# Patient Record
Sex: MALE | Race: WHITE | Employment: FULL TIME | URBAN - METROPOLITAN AREA
[De-identification: names, ages, dates, MRNs, and addresses within clinical notes are randomized per-mention and may not be internally consistent; named-entity substitution may affect disease eponyms.]

---

## 2020-04-25 ENCOUNTER — HOSPITAL ENCOUNTER (EMERGENCY)
Facility: HOSPITAL | Age: 25
Discharge: HOME | End: 2020-04-25
Attending: EMERGENCY MEDICINE
Payer: COMMERCIAL

## 2020-04-25 ENCOUNTER — APPOINTMENT (OUTPATIENT)
Dept: CT IMAGING | Facility: HOSPITAL | Age: 25
End: 2020-04-25
Attending: EMERGENCY MEDICINE
Payer: COMMERCIAL

## 2020-04-25 VITALS
WEIGHT: 135 LBS | BODY MASS INDEX: 23.05 KG/M2 | OXYGEN SATURATION: 97 % | DIASTOLIC BLOOD PRESSURE: 86 MMHG | TEMPERATURE: 99 F | RESPIRATION RATE: 16 BRPM | SYSTOLIC BLOOD PRESSURE: 122 MMHG | HEIGHT: 64 IN | HEART RATE: 76 BPM

## 2020-04-25 DIAGNOSIS — R10.9 ABDOMINAL PAIN, UNSPECIFIED ABDOMINAL LOCATION: Primary | ICD-10-CM

## 2020-04-25 PROCEDURE — 74177 CT ABD & PELVIS W/CONTRAST: CPT | Performed by: EMERGENCY MEDICINE

## 2020-04-25 PROCEDURE — 96374 THER/PROPH/DIAG INJ IV PUSH: CPT

## 2020-04-25 PROCEDURE — 96361 HYDRATE IV INFUSION ADD-ON: CPT

## 2020-04-25 PROCEDURE — 80053 COMPREHEN METABOLIC PANEL: CPT | Performed by: EMERGENCY MEDICINE

## 2020-04-25 PROCEDURE — 85025 COMPLETE CBC W/AUTO DIFF WBC: CPT | Performed by: EMERGENCY MEDICINE

## 2020-04-25 PROCEDURE — 99284 EMERGENCY DEPT VISIT MOD MDM: CPT

## 2020-04-25 PROCEDURE — 96375 TX/PRO/DX INJ NEW DRUG ADDON: CPT

## 2020-04-25 PROCEDURE — 83690 ASSAY OF LIPASE: CPT | Performed by: EMERGENCY MEDICINE

## 2020-04-25 PROCEDURE — 99285 EMERGENCY DEPT VISIT HI MDM: CPT

## 2020-04-25 RX ORDER — DIPHENHYDRAMINE HYDROCHLORIDE 50 MG/ML
25 INJECTION INTRAMUSCULAR; INTRAVENOUS ONCE
Status: COMPLETED | OUTPATIENT
Start: 2020-04-25 | End: 2020-04-25

## 2020-04-25 RX ORDER — PROPRANOLOL HYDROCHLORIDE 20 MG/1
20 TABLET ORAL 3 TIMES DAILY
COMMUNITY

## 2020-04-25 RX ORDER — ONDANSETRON 2 MG/ML
4 INJECTION INTRAMUSCULAR; INTRAVENOUS ONCE
Status: COMPLETED | OUTPATIENT
Start: 2020-04-25 | End: 2020-04-25

## 2020-04-25 RX ORDER — BENZTROPINE MESYLATE 2 MG/1
2 TABLET ORAL ONCE
Status: COMPLETED | OUTPATIENT
Start: 2020-04-25 | End: 2020-04-25

## 2020-04-25 RX ORDER — DEXTROAMPHETAMINE SACCHARATE, AMPHETAMINE ASPARTATE, DEXTROAMPHETAMINE SULFATE AND AMPHETAMINE SULFATE 2.5; 2.5; 2.5; 2.5 MG/1; MG/1; MG/1; MG/1
30 TABLET ORAL ONCE
Status: COMPLETED | OUTPATIENT
Start: 2020-04-25 | End: 2020-04-25

## 2020-04-25 RX ORDER — PROPRANOLOL HYDROCHLORIDE 20 MG/1
20 TABLET ORAL ONCE
Status: COMPLETED | OUTPATIENT
Start: 2020-04-25 | End: 2020-04-25

## 2020-04-25 RX ORDER — DEXTROAMPHETAMINE SACCHARATE, AMPHETAMINE ASPARTATE, DEXTROAMPHETAMINE SULFATE AND AMPHETAMINE SULFATE 2.5; 2.5; 2.5; 2.5 MG/1; MG/1; MG/1; MG/1
10 TABLET ORAL EVERY EVENING
COMMUNITY

## 2020-04-25 RX ORDER — BENZTROPINE MESYLATE 2 MG/1
2 TABLET ORAL 2 TIMES DAILY
COMMUNITY

## 2020-04-25 RX ORDER — METOCLOPRAMIDE HYDROCHLORIDE 5 MG/ML
10 INJECTION INTRAMUSCULAR; INTRAVENOUS ONCE
Status: COMPLETED | OUTPATIENT
Start: 2020-04-25 | End: 2020-04-25

## 2020-04-25 NOTE — ED NOTES
Patient is resting comfortably. Updated patient's parents on plan of care, left voicemail for Negar and Illene Situ with [facility] that patient is being discharged from the ED and will need transportation back.  Patient's am medications were

## 2020-04-25 NOTE — CM/SW NOTE
Spoke with father. He has confirmed that Negar, supervisor of his son's group home, will pick him up.     Group home at [facility], [address] [place] [address]  [phone]

## 2020-04-25 NOTE — ED NOTES
PT BACK FROM CT. PT ESCORTED BY PCT TO MAINTAIN PT SAFETY. SIDE RAILS UP, CALL LIGHT WITH IN REACH, VS DONE.

## 2020-04-25 NOTE — ED PROVIDER NOTES
Patient Seen in: [facility] Emergency Department      History   Patient presents with:  Abdominal Pain  Anxiety/Panic attack    Stated Complaint: Anxiety;  Abdl Pain    HPI    Patient is a 23-year-old male history of autism, poor historian comes in w Pulmonary:      Effort: Pulmonary effort is normal. No respiratory distress. Abdominal:      General: There is no distension. Palpations: Abdomen is soft. Tenderness: There is tenderness in the epigastric area.    Musculoskeletal: Normal range reduction techniques     were used. Dose information is     transmitted to the  [facility]) NRDR ([address]) which includes the Dose Index Registry.          PATIENT STATED HISTORY:(As transcribed by Iraida Mtz [id number] [phone]    In 2 days  Return to the ER if you feel worse in any way, Return to the ER if you have any concerns          Medications Prescribed:  Current Discharge Medication List

## 2020-04-25 NOTE — ED NOTES
Negar from group home states they will not  the patient.  discussing with family and group home to arrange transport back to group [place].

## 2020-04-25 NOTE — ED NOTES
Patient walked out to [facility] in front of the ER. Staff given patient binder and discharge papers. Staff informed which medications patient had been received.

## 2020-04-25 NOTE — ED INITIAL ASSESSMENT (HPI)
Pt to ED brought by EMS for c/o Anxiety and Abdl Pain. Per EMS report, Pt lives in a group home - Pt was c/o inability to sleep last night and was requesting group home staff to give his AM meds.  Per report, group home staff declined to give Pt's 8 AM meds

## 2020-04-26 ENCOUNTER — HOSPITAL ENCOUNTER (EMERGENCY)
Facility: HOSPITAL | Age: 25
Discharge: STILL ADMITTED OR ADMINISTRATIVE | End: 2020-04-26
Payer: COMMERCIAL

## 2022-05-17 ENCOUNTER — OCC HEALTH (OUTPATIENT)
Dept: OCCUPATIONAL MEDICINE | Age: 27
End: 2022-05-17
Attending: PREVENTIVE MEDICINE

## 2022-05-17 DIAGNOSIS — Z77.21 EXPOSURE TO BLOOD-BORNE PATHOGEN: Primary | ICD-10-CM

## 2022-05-17 LAB
HBV SURFACE AG SER-ACNC: <0.1 [IU]/L
HBV SURFACE AG SERPL QL IA: NONREACTIVE
HCV AB SERPL QL IA: NONREACTIVE

## 2022-05-17 PROCEDURE — 86803 HEPATITIS C AB TEST: CPT

## 2022-05-17 PROCEDURE — 87340 HEPATITIS B SURFACE AG IA: CPT

## 2022-05-17 PROCEDURE — 87389 HIV-1 AG W/HIV-1&-2 AB AG IA: CPT

## 2022-06-09 NOTE — ED QUICK NOTES
Group Home sitter notified that we are still waiting for Psychiatrist to come down and evaluate patient. Patient was given a menu so he can order dinner.
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
## 2022-06-09 NOTE — ED QUICK NOTES
Patient is alert and oriented, resting on the cot, no distress noted. Will continue to monitor. Group home sitter at bedside.
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
## 2022-06-09 NOTE — ED QUICK NOTES
Assumed care of patient at this time. Patient is alert and oriented, resting on the cot, sitter is at bedside. Will continue to monitor.

## 2022-06-09 NOTE — ED QUICK NOTES
Patient asking for his afternoon meds. Dr. Wendi Johnson notified and states medication order will be placed.

## 2022-06-10 NOTE — ED PROVIDER NOTES
No issues during my shift. Patient is autistic and having aggressive behavior. He is awaiting transfer to inpatient psychiatric facility.
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
## 2022-06-10 NOTE — ED QUICK NOTES
Patient remains calm and cooperative. Patient ordered dinner tray and took a shower. Sitter remains at bedside.

## 2022-06-10 NOTE — ED NOTES
[facility]: Deflected d/t acuity  [facility]: No beds  [facility]: No beds  [facility]: Deflected d/t acuity  [facility]: Deflected d/t acuity  [facility]: Deflected d/t acuity  [facility]: Deflected d/t \"Cannot take adult Pt who is not their own guardian\"  [facility]: Deflected d/t OON  [facility]: Deflected d/t acuity  [facility]: Deflected d/t acuity  [facility]: Will review, packet faxed  [facility]: Deflected d/t [address] [facility] [address]: [address]: Packet faxed, deflected d/t unit full

## 2022-06-10 NOTE — ED NOTES
[facility]- [facility]

## 2022-06-10 NOTE — ED PROVIDER NOTES
No issues during shift. Patient was evaluated by Dr. Barry Clark.   Awaiting transfer to inpatient psychiatric facility

## 2022-06-10 NOTE — ED NOTES
Transfer progress during noc shift  [facility]- no acute beds  [facility]- clinical faxed  [facility]- no beds  [facility]- no beds  [facility]- no beds

## 2022-06-11 NOTE — ED NOTES
Spoke with Fanny Altman at [address] [place] [address] who stated that Referral Packet has yet to be reviewed. Fanny Altman stated that when RN to RN takes place, the Nurse can communicate to [facility] Nurse if any medication list from [address] [place] [address] is needed.

## 2022-06-11 NOTE — ED QUICK NOTES
Spoke with Yash Schultz at [facility] have accepted the patient but cannot have him transfer to their facilities until tomorrow morning 1000    Direct line for [facility] is: [phone]

## 2022-06-11 NOTE — ED NOTES
Received a call back from [facility] who stated that they are unable to accommodate due to acuity.
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
## 2022-06-11 NOTE — ED NOTES
Patient remains cooperative at this time. Patient with no other complaints at this time. Patient has been able to eat and drink without difficulty here in the ER. Patient's case endorsed to my colleague, Dr. Gely Blair at the end of my shift.

## 2022-06-11 NOTE — ED QUICK NOTES
Patient requested lunch be ordered. Patient requested pizza, cookie, and a coke. Food tray called down to food services.

## 2022-06-11 NOTE — ED NOTES
Received a call back from [facility] in ED Registration who confirmed that Patient does have [employer] as a primary insurance. Medicaid is listed as secondary. Facesheet has been updated.

## 2022-06-11 NOTE — PROGRESS NOTES
Assumed pt care at [address] [place] [address] Rd. Pt slept all night. Remains calm and cooperative this morning. Breakfast tray to be ordered. Staff will continue to monitor.

## 2022-06-11 NOTE — ED NOTES
Called the following Hospitals for in-patient placement:    [address]- No beds available    [facility]- No beds available     [facility]- No beds available    [facility]- Unable to accommodate due to 1 bed available and they have a patient in their ED    [facility]- No acute beds available    [facility]- No beds available    [facility]- No beds available. It was suggested to call back later this afternoon to inquire about any discharges    [facility]- No beds available    [facility]- Referral made over the phone. Referral Packet has been faxed for review. Patient will need an EKG    [facility]- Left voice mail message    [facility]- No beds available    [facility]- Referral made over the phone.  Referral Packet has been faxed for review    [facility]- No beds available    [facility]- Unable to accommodate patients who have autism    [facility]- Unable to accommodate patients who have autism    [facility]- Unable to accommodate patients who have autism    [facility]- Unable to reach Intake

## 2022-06-11 NOTE — ED PROVIDER NOTES
The patient has been cooperative and calm while here in the emergency department. I was told by the nurses had spoken to the caseworkers that we had exhausted every possible place to transfer the patient. He is a hard transfer secondary to his history of violent behavior and his autism. I was told that if the patient is still here on Monday, June 13 and has not had any violent outbursts they would likely attempt to discharge the patient to his family. In the interim he will need to continue to wait here for possible placement in a facility since the group home will not take him back until he has had some admission.

## 2022-06-11 NOTE — ED NOTES
Called Ramirez's Mom/Legal Guardian, Esteban Walters to provide an update on hospital placement. Informed Shelby that [facility] and [facility] are reviewing for placement. [facility] stated that their Unit would need the Physician's Orders for medications from the [address] [place] [address]. Esteban Walters stated that she did not have the orders and that we would need to reach out to the group home.

## 2022-06-11 NOTE — ED NOTES
Called the following Hospitals for in-patient:    [facility]- No beds available     [facility]- No beds available    [facility]- No beds available    [facility] to reach [facility] with Edna in Intake. When running insurance, she had stated that it was coming up as Patient having [facility] was unable to continue with referral as [employer] is [employer] Ty Mo, ED Registration to verify insurance. Joanna Julian was with a patient and will call back.

## 2022-06-11 NOTE — ED NOTES
This writer met w/Ramirez who was calm, cooperative and excited when this writer brought in cards and play-anne for him to play with to help keep his mind occupied. This writer will check back in.

## 2022-06-11 NOTE — ED NOTES
[facility] states they have been busy and haven't been able to review the transfer packet yet. They state that it will probably be reviewed on Noc shift.

## 2022-06-12 NOTE — ED NOTES
Received a call from the pt's parents (around 7:40pm) who had several questions:    Q: Is [facility] a good hospital? A: patients that go to [address] [place] [address] sometimes have good experiences and sometimes don't, just like at any other [facility].     Q: Should we bring belongings to [facility] or [address] [place] [address]? A: Belongings can go to either place, it's only a matter of preference. Q: Can the pt call us? A: I will ask the nurse to provide the pt with a phone.

## 2022-06-12 NOTE — ED QUICK NOTES
Recd report from [facility]. Patient belongings given to family. Transport to facility ETA 1600, patient and family updated on POC, wctm closely.

## 2022-06-12 NOTE — ED NOTES
This writer checked in on Shola Maritza first thing this am.  Shola Salas was awake and told this writer he had pancakes for breakfast and was wanting to know what time he would be leaving. This writer informed it that it would be after lunch, but we would update him and his family when we got a more definitive time. Shola Salas said he still has his crayons, play-anne and paper I had given him yesterday and did not need anything else.

## 2022-06-12 NOTE — ED NOTES
Spoke with [facility] who confirmed that transportation is to be set up for 4:00PM.    Spoke with Ramirez's parents/ Legal Guardian,  Ana Luisa Kaur and Andi (who were in the room),regarding placement. Informed Ana Luisa Kaur and Elizabeth Alfonso that transfer will take place at 4:00PM. Ana Luisa Kaur inquired about visiting hours. Called [facility] and was provided the following information:    -Elizabeth Alfonso will be admitted to [facility]  -There is no in-person visiting. However, Phone time is 7 days a week. 10-10:30AM, 2-2:30PM and 6-7PM  -Direct Phone Number to Unit is [phone], Patient GUS Chavez stated that they would be contacting Ana Luisa Kaur as well before transfer. Provided all information from [facility] Dragan Keita regarding scheduled phone time, and direct contact information to the Unit to [facility]. Ana Luisa Kaur stated that Kristen Keita had also contacted her by phone.

## 2022-06-12 NOTE — ED QUICK NOTES
Spoke with Hernan Dillon - mother and legal guardian of patient - who is requesting to see if it's possible for the patient not to go to [facility] and to go home with parents instead. This RN will forward mother's request to [facility] shift [place]  and ED RN for follow up later this evening.
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
## 2022-07-14 NOTE — ED QUICK NOTES
Assume pt care, report from [facility]. Pt sound asleep in bed. Resps easy, regular.  1:1 sitter remains at bedside
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
## 2022-07-14 NOTE — PROGRESS NOTES
07/13/22 2012   COVID Exposure Risk Screening   Do you have any of the following new or worsening symptoms of COVID-19? None   Have you been diagnosed with COVID-19 within the past 10 days? No   Are you awaiting COVID-19 test results or do you have a COVID-19 test scheduled? No   In the past 10 days, have you been in contact with someone who was confirmed or suspected to have COVID-19?  No

## 2022-07-14 NOTE — PROGRESS NOTES
07/13/22 214   Advance Directives for Healthcare   Does the patient have:  Guardian  (parents: Jaya Almanzar [facility])   Is there a copy on the chart?  Yes

## 2022-07-14 NOTE — ED NOTES
Pt was assessed and recommended inpatient admission. Pt and parents/Legal Guardians were updated on the POC. Pt presented as ox4 and was agreeable with the POC. Pt and father signed paperwork and received copies. Parents/guardians request [facility] or [facility].

## 2022-07-14 NOTE — ED NOTES
[id number] [facility] NO appropriate beds. Was told to call again tomorrow    [facility] multiple times during the day.   NO answer

## 2022-07-14 NOTE — ED QUICK NOTES
Patient declines breakfast at this time, will order when more awake. Denies any other needs at this time.

## 2022-07-14 NOTE — ED QUICK NOTES
Parents left for the night, pt changed into gown and belongings placed in smart safe bag.  Parents took patients medication home with them
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
## 2022-07-14 NOTE — ED PROVIDER NOTES
Patient calm and cooperative during my shift. No issues. Patient having auditory hallucinations. Awaiting placement.

## 2022-07-14 NOTE — ED NOTES
[facility]- no beds tonight at [facility]. They state they will keep the transfer packet and to call back tomorrow to see if there are discharges.

## 2022-07-14 NOTE — ED NOTES
[facility]- no beds tonight.  They state to call back tomorrow to see if they have discharges  [facility]- clinical faxed for review for [facility]

## 2022-07-15 NOTE — BH PROGRESS NOTE
[facility]'s intake called this writer and said that they now have an opening at their [facility] location. Accepting dr is Dr. Lourdes Perez. Address is [address] in [place]. Once the ambulance arrives at [facility] ER, pt's RN is supposed to call for N2N at [phone]. Writer notified pt's mom of this info, mom said she wants to discuss with pt's dad, who is in the ER with the pt. Writer told her to have the dad give their final decision to pt's RN Love Justin. Writer notified Love Justin of this information, as well as [facility] Cindi Doan.

## 2022-07-15 NOTE — ED QUICK NOTES
Spoke to [place] from [facility] for nurse to nurse report, to be contacted soon for admission status.

## 2022-07-15 NOTE — ED NOTES
Placement:     [facility]- bed request made. No appropriate bed    [facility]- No appropriate bed    [facility]- No beds available (no clinical provided)    [facility]- No appropriate bed    [facility]- faxed updated paperwork for packet. No beds tonight, but intake team will forward request for noc shift to review for possibility of bed tomorrow    [facility]- Per Gwynn[facility] Patches, no beds    [facility]- No appropriate bed    [facility]- No appropriate beds. Was told to call again tomorrow  [address] multiple times during the day.  No answer    [facility]- Still no discharges and as of right now, no planned DC's for tomorrow

## 2022-07-15 NOTE — BH PROGRESS NOTE
[place] [address] has an accepting dr, Dr. Dereck Shannon, but according to [facility] staff in the ED, the pt's mom/guardian does not want the pt to go to a hospital that far. Mom is requesting [facility]. Writer notified [place].

## 2022-07-15 NOTE — ED NOTES
Updated pt's mother regarding placement search/status.  No updates as of this time but will continue to follow up

## 2022-07-15 NOTE — BH PROGRESS NOTE
Placement 7/15/22:    [facility]-reviewing packet    [facility] his packet    [facility]-no bed on the unit he'd need, if there is a discharge, they will call us, they have his packet    [facility]-No discharges today, was told to call again tomorrow.     [facility]-    NO CLINICALLY APPROPRIATE BEDS:  [facility]  [address] [facility]  [address] [address]  [place]:  Jamir Metzger  [place]    NO BEDS (NO CLINICAL PROVIDED):  [address] [facility]  [address]  [facility]

## 2022-07-15 NOTE — ED QUICK NOTES
Informed by José Miguel Babin, that patient was accepted by [facility] in [facility]. Informed father of patient's acceptance. Per facility once ambulance arrives for patient transport, nurse to nurse can be called at [phone]. Accepting doctor is Dr. Brenton Marie.

## 2022-07-15 NOTE — ED QUICK NOTES
Family of patient was informed of patient's acceptance to [facility], pending a new covid test. Patient has also being accepted to [facility] in [facility]. Mother would like to talk to someone in [facility] before making a decision. Family is aware that it is time sensitive. Writer called [facility] intake to update; they said for us to call them tomorrow to see if a bed is available.

## 2022-07-15 NOTE — ED NOTES
Faxed updated documentation and vitals to [facility] for [facility] to review. [facility] needs to follow up with them in the AM regarding disposition of bed availability. Per Navarro Herring will be able to accommodate, but admission will depend on staffing. Pt's father updated of plan of care and will let mom know.

## 2022-07-15 NOTE — ED QUICK NOTES
Both Father and Mother spoke to [place] [address] in [place]. Family would like to wait for a closer location to have availability. At this time, they would like to hold off [facility].

## 2022-07-15 NOTE — BH PROGRESS NOTE
[facility] will be able to accommodate pt today; writer asked pt's BREANNE Meehan to please do a repeat rapid COVID test.

## 2022-07-15 NOTE — ED QUICK NOTES
Took over care for patient from [facility]. Patient resting comfortably in room with mother at bedside.

## 2022-07-16 NOTE — ED QUICK NOTES
EMS at bedside for patient transport to [facility]. Nurse to nurse report given to [facility].

## 2022-07-31 NOTE — ED NOTES
Referral Packet has been faxed to [facility] for review. [facility] stated that they have a bed available at [facility] location. There are a few beds available at the [facility] location. Called Ramirez's Mom/Legal Guardian, Vimal Zambrano to inform her that referral has been made to [facility]. Vimal Zambrano stated that she prefers the [facility] location. Vimal Zambrano was pleased with the care from [address] [place] [address] recent admission. Vimal Zambrano wants to hold off making referrals to other hospitals at this time. Will wait for disposition from [facility].

## 2022-07-31 NOTE — ED QUICK NOTES
Spoke with pt's mother, Ken Mullins. [phone]    Mother states she spoke with transfer team at [facility] in [place], [place] stating they have a bed available and reviewing pt's chart at this time. Mother called for update on pt. Mother also states that the reason she and pt's father are not coming to the hospital to visit is to avoid the impression that pt can go back to their house after. Mother states pt \"is just trying to escape the group home. \"
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
## 2022-07-31 NOTE — ED NOTES
Writer introduced self/role to pt who presents as calm and cooperative. Staff member from group home was present in the room. Writer offered unit resources to pt who refused at this time. Staff member stated that they would notify writer if the pt needed anything.
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
## 2022-08-01 ENCOUNTER — APPOINTMENT (OUTPATIENT)
Dept: GENERAL RADIOLOGY | Facility: HOSPITAL | Age: 27
End: 2022-08-01
Attending: EMERGENCY MEDICINE
Payer: COMMERCIAL

## 2022-08-01 ENCOUNTER — APPOINTMENT (OUTPATIENT)
Dept: CT IMAGING | Facility: HOSPITAL | Age: 27
End: 2022-08-01
Attending: EMERGENCY MEDICINE
Payer: COMMERCIAL

## 2022-08-01 PROCEDURE — 74177 CT ABD & PELVIS W/CONTRAST: CPT | Performed by: EMERGENCY MEDICINE

## 2022-08-01 PROCEDURE — 71275 CT ANGIOGRAPHY CHEST: CPT | Performed by: EMERGENCY MEDICINE

## 2022-08-01 PROCEDURE — 71045 X-RAY EXAM CHEST 1 VIEW: CPT | Performed by: EMERGENCY MEDICINE

## 2022-08-01 NOTE — ED PROVIDER NOTES
Patient will be transferred to psychiatric hospital in [facility] issues over the course of my shift

## 2022-08-01 NOTE — ED NOTES
Called [facility] for update on referral status and was asked about the chest x-ray and CT that were ordered. Writer agreed to fax over MD note. [facility] RN also asked why the pt's WBC might be elevated, and writer agreed to agreed to find out.

## 2022-08-01 NOTE — ED NOTES
This writer received an incoming phone call from [facility] completing nurse to nurse for patient. She wants to speak with nurse for more information.

## 2022-08-01 NOTE — ED NOTES
This writer contacted [facility] admissions number at [phone]. This writer was informed to call Mobile number (mailbox is full) at [phone]. This writer left a voicemail message.

## 2022-08-01 NOTE — ED NOTES
This writer [facility]ed redrawn CBC [facility] nursing request.

## 2022-08-01 NOTE — ED QUICK NOTES
RN spoke with Alexandra Dueñas in [facility].  Updated lab results will be sent to [facility]

## 2022-08-01 NOTE — ED PROVIDER NOTES
I went to assess the patient and he tells me that his tummy hurts. He does have a white blood cell count of 20.9 and on exam now he is does seem to be a little distended. He indicates that his pain is most in the right upper quadrant but he has a really odd affect he is smiling in agreement with me with a big smile on his face I think unfortunately that that makes communication hard with his underlying significant autism.   But he has a white count of 20.9 and he tells me that his abdomen hurts I am going to add on a lipase and a CT abdomen pelvis differential diagnosis includes acute appendicitis, cholecystitis, appendicitis, bowel obstruction he has had an episode of vomiting here in the emergency department I do not see where he has had abdominal surgery before

## 2022-08-01 NOTE — ED PROVIDER NOTES
Lipase normal.    CT scan of the abdomen pelvis shows no acute abnormality although there are small bilateral pleural effusions. Patient denies chest pain, shortness of breath or cough    D-dimer and chest x-ray ordered    Chest x-ray clear    D-dimer slightly elevated    CT chest with small bilateral pleural effusions, no PE, nothing acute    Patient is medically clear at 445.     Still awaiting psychiatric placement

## 2022-08-01 NOTE — ED NOTES
[facility] called requesting a letter of return from the group home. A voicemail was left for [facility].

## 2022-08-01 NOTE — ED QUICK NOTES
Received report from Sabine Zuniga, [facility]. Pt resting on cart, calm and cooperative at this time. Patient waiting for tranpsort at this time.

## 2022-08-01 NOTE — ED NOTES
This writer contacted Primitivo Eugene of [facility] regarding a status of patients referral status. Patients white count was received and it is back to normal.      This writer discussed with Primitivo Eugene that they are looking at a bed for their [place], [place] location, for which they do have beds. They are requesting an updated temperature. This writer notified nursing.
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
## 2022-08-18 NOTE — ED NOTES
Discussed case with Dr. Dee Lance. Follow-up with outpatient is disposition. Spoke with group home staff at bedside who are not agreeable and refusing to have patient return. Dr. Dee Lance reiterated that patient is not meeting criteria for inpatient admission. Dr. Dee Lance stated that he will involve case management as this time to speak with group home.

## 2022-08-18 NOTE — CM/SW NOTE
EDCM received call from Dr. Zayra Camejo requesting we contact pt  and advise them to:    1. Hold patient depakote for a couple days due to elevated Depakote levels and recheck levels    2.  Pt anti ulcer medication and zofran sent to pharmacy for pickup    Contacted patient  Aixa Burden who informed me [facility] needs to fax an order to hold the depakote to nursing @ [phone]
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
## 2022-08-19 ENCOUNTER — HOSPITAL ENCOUNTER (EMERGENCY)
Facility: HOSPITAL | Age: 27
Discharge: HOME | End: 2022-08-19
Attending: EMERGENCY MEDICINE
Payer: COMMERCIAL

## 2022-08-19 VITALS
OXYGEN SATURATION: 95 % | HEIGHT: 68 IN | SYSTOLIC BLOOD PRESSURE: 128 MMHG | BODY MASS INDEX: 25.07 KG/M2 | DIASTOLIC BLOOD PRESSURE: 90 MMHG | RESPIRATION RATE: 18 BRPM | HEART RATE: 95 BPM | WEIGHT: 165.38 LBS | TEMPERATURE: 99 F

## 2022-08-19 DIAGNOSIS — L51.9 ERYTHEMA MULTIFORME: Primary | ICD-10-CM

## 2022-08-19 LAB
ALBUMIN SERPL-MCNC: 3.5 G/DL (ref 3.4–5)
ALBUMIN/GLOB SERPL: 0.9 {RATIO} (ref 1–2)
ALP LIVER SERPL-CCNC: 54 U/L
ALT SERPL-CCNC: 26 U/L
ANION GAP SERPL CALC-SCNC: 4 MMOL/L (ref 0–18)
AST SERPL-CCNC: 14 U/L (ref 15–37)
BASOPHILS # BLD AUTO: 0.06 X10(3) UL (ref 0–0.2)
BASOPHILS NFR BLD AUTO: 0.6 %
BILIRUB SERPL-MCNC: 0.3 MG/DL (ref 0.1–2)
BILIRUB UR QL STRIP.AUTO: NEGATIVE
BUN BLD-MCNC: 17 MG/DL (ref 7–18)
CALCIUM BLD-MCNC: 9.1 MG/DL (ref 8.5–10.1)
CHLORIDE SERPL-SCNC: 109 MMOL/L (ref 98–112)
CO2 SERPL-SCNC: 28 MMOL/L (ref 21–32)
COLOR UR AUTO: YELLOW
CREAT BLD-MCNC: 0.95 MG/DL
CRP SERPL-MCNC: 4.5 MG/DL (ref ?–0.3)
EOSINOPHIL # BLD AUTO: 0.96 X10(3) UL (ref 0–0.7)
EOSINOPHIL NFR BLD AUTO: 9 %
ERYTHROCYTE [DISTWIDTH] IN BLOOD BY AUTOMATED COUNT: 12.7 %
ERYTHROCYTE [SEDIMENTATION RATE] IN BLOOD: 13 MM/HR
GFR SERPLBLD BASED ON 1.73 SQ M-ARVRAT: 112 ML/MIN/1.73M2 (ref 60–?)
GLOBULIN PLAS-MCNC: 3.7 G/DL (ref 2.8–4.4)
GLUCOSE BLD-MCNC: 88 MG/DL (ref 70–99)
GLUCOSE UR STRIP.AUTO-MCNC: NEGATIVE MG/DL
HCT VFR BLD AUTO: 42.5 %
HGB BLD-MCNC: 14.2 G/DL
IMM GRANULOCYTES # BLD AUTO: 0.04 X10(3) UL (ref 0–1)
IMM GRANULOCYTES NFR BLD: 0.4 %
KETONES UR STRIP.AUTO-MCNC: NEGATIVE MG/DL
LEUKOCYTE ESTERASE UR QL STRIP.AUTO: NEGATIVE
LYMPHOCYTES # BLD AUTO: 2.12 X10(3) UL (ref 1–4)
LYMPHOCYTES NFR BLD AUTO: 19.8 %
MCH RBC QN AUTO: 31.5 PG (ref 26–34)
MCHC RBC AUTO-ENTMCNC: 33.4 G/DL (ref 31–37)
MCV RBC AUTO: 94.2 FL
MONOCYTES # BLD AUTO: 1.56 X10(3) UL (ref 0.1–1)
MONOCYTES NFR BLD AUTO: 14.6 %
NEUTROPHILS # BLD AUTO: 5.97 X10 (3) UL (ref 1.5–7.7)
NEUTROPHILS # BLD AUTO: 5.97 X10(3) UL (ref 1.5–7.7)
NEUTROPHILS NFR BLD AUTO: 55.6 %
NITRITE UR QL STRIP.AUTO: NEGATIVE
OSMOLALITY SERPL CALC.SUM OF ELEC: 293 MOSM/KG (ref 275–295)
PH UR STRIP.AUTO: 6 [PH] (ref 5–8)
PLATELET # BLD AUTO: 200 10(3)UL (ref 150–450)
POTASSIUM SERPL-SCNC: 4.3 MMOL/L (ref 3.5–5.1)
PROT SERPL-MCNC: 7.2 G/DL (ref 6.4–8.2)
PROT UR STRIP.AUTO-MCNC: NEGATIVE MG/DL
RBC # BLD AUTO: 4.51 X10(6)UL
RBC UR QL AUTO: NEGATIVE
RHEUMATOID FACT SERPL-ACNC: <10 IU/ML (ref ?–15)
SODIUM SERPL-SCNC: 141 MMOL/L (ref 136–145)
SP GR UR STRIP.AUTO: 1.03 (ref 1–1.03)
T PALLIDUM AB SER QL IA: NONREACTIVE
UROBILINOGEN UR STRIP.AUTO-MCNC: 4 MG/DL
VALPROATE SERPL-MCNC: 41.9 UG/ML (ref 50–100)
WBC # BLD AUTO: 10.7 X10(3) UL (ref 4–11)

## 2022-08-19 PROCEDURE — 86140 C-REACTIVE PROTEIN: CPT | Performed by: EMERGENCY MEDICINE

## 2022-08-19 PROCEDURE — 96374 THER/PROPH/DIAG INJ IV PUSH: CPT

## 2022-08-19 PROCEDURE — 86780 TREPONEMA PALLIDUM: CPT | Performed by: EMERGENCY MEDICINE

## 2022-08-19 PROCEDURE — 80164 ASSAY DIPROPYLACETIC ACD TOT: CPT | Performed by: EMERGENCY MEDICINE

## 2022-08-19 PROCEDURE — 80053 COMPREHEN METABOLIC PANEL: CPT | Performed by: EMERGENCY MEDICINE

## 2022-08-19 PROCEDURE — 99285 EMERGENCY DEPT VISIT HI MDM: CPT

## 2022-08-19 PROCEDURE — 85652 RBC SED RATE AUTOMATED: CPT | Performed by: EMERGENCY MEDICINE

## 2022-08-19 PROCEDURE — 85025 COMPLETE CBC W/AUTO DIFF WBC: CPT | Performed by: EMERGENCY MEDICINE

## 2022-08-19 PROCEDURE — 86038 ANTINUCLEAR ANTIBODIES: CPT | Performed by: EMERGENCY MEDICINE

## 2022-08-19 PROCEDURE — 81003 URINALYSIS AUTO W/O SCOPE: CPT | Performed by: EMERGENCY MEDICINE

## 2022-08-19 PROCEDURE — 99284 EMERGENCY DEPT VISIT MOD MDM: CPT

## 2022-08-19 PROCEDURE — 86431 RHEUMATOID FACTOR QUANT: CPT | Performed by: EMERGENCY MEDICINE

## 2022-08-19 PROCEDURE — 86200 CCP ANTIBODY: CPT | Performed by: EMERGENCY MEDICINE

## 2022-08-19 RX ORDER — DIPHENHYDRAMINE HCL 50 MG
50 CAPSULE ORAL ONCE
Status: COMPLETED | OUTPATIENT
Start: 2022-08-19 | End: 2022-08-19

## 2022-08-19 RX ORDER — PREDNISONE 20 MG/1
40 TABLET ORAL DAILY
Qty: 6 TABLET | Refills: 0 | Status: SHIPPED | OUTPATIENT
Start: 2022-08-19 | End: 2022-08-23

## 2022-08-19 RX ORDER — METHYLPREDNISOLONE SODIUM SUCCINATE 125 MG/2ML
60 INJECTION, POWDER, LYOPHILIZED, FOR SOLUTION INTRAMUSCULAR; INTRAVENOUS ONCE
Status: COMPLETED | OUTPATIENT
Start: 2022-08-19 | End: 2022-08-19

## 2022-08-19 RX ORDER — PREDNISONE 20 MG/1
60 TABLET ORAL ONCE
Status: DISCONTINUED | OUTPATIENT
Start: 2022-08-19 | End: 2022-08-19

## 2022-08-19 RX ORDER — DIPHENHYDRAMINE HYDROCHLORIDE 12.5 MG/5ML
25 SOLUTION ORAL 4 TIMES DAILY PRN
Qty: 1 EACH | Refills: 0 | Status: SHIPPED | OUTPATIENT
Start: 2022-08-19 | End: 2022-08-23

## 2022-08-19 NOTE — ED INITIAL ASSESSMENT (HPI)
Arrives with bumps to most of body x 3 days and vomiting since yesterday. Lives in a group home. Here with caretaker.

## 2022-08-19 NOTE — ED QUICK NOTES
Spoke to mom Everardo Silva on the phone she reports patient was on Amoxicillin on August 8th. Finished course, then started on Augmentin on August 15th. Patient also recently started Synthroid on August 9th. Mom unsure if these cause rash today. MD notified. Phone for mom .

## 2022-08-23 LAB — CCP IGG SERPL-ACNC: 1 U/ML (ref 0–6.9)

## 2022-08-23 NOTE — ED NOTES
Juan José in [facility] point requested for Pt's packet to be re-faxed. This writer faxed the Pt's complete packet to Juan José at [phone].
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
## 2022-08-23 NOTE — ED INITIAL ASSESSMENT (HPI)
Patient is very anxious calm and co-operative now . He told he was very anxious in the group home about his lunch.

## 2022-08-24 NOTE — ED NOTES
Writer called pt's mother Everardo Silva to inform her that the pt has been deflected by [facility] [place] [address]. Everardo Silva stated that she is okay with referring to [facility] Fitzgerald as well.

## 2022-08-24 NOTE — ED NOTES
At [employer] writer received call from [facility] deflecting the pt. Writer also called [facility] who said they would take the pt's packet again, so one was faxed.

## 2022-08-24 NOTE — ED NOTES
Mani Dinero from [facility] called stating they are unable to place pt at any of their facilities tonight due to high acuity on their units.

## 2022-08-24 NOTE — ED NOTES
Placement:    [facility]: Pt has exhausted therapeutic options    [facility]: Faxed packet. .. no beds     [facility]: Packet faxed

## 2022-08-24 NOTE — ED QUICK NOTES
Discharge Instructions for Cellulitis  You have been diagnosed with cellulitis. This is an infection in the deepest layer of the skin. In some cases, the infection also affects the muscle. Cellulitis is caused by bacteria. The bacteria canÂ enter the body through broken skin. This can happen with a cut, scratch, animal bite, or an insect bite that has been scratched. You may have been treated in the hospital with antibiotics and fluids. You will likely be given a prescription for antibiotics to take at home. This sheet will help you take care of yourself at home. Home Care  When you are home:  Â· Take the prescribed antibiotic medication you are given as directed until it is gone. Take it even if you feel better. It treats the infection and stops it from returning. Not taking all of the medication can make future infections hard to treat. Â· Keep the infected area clean. Â· When possible, raise the infected area above the level of your heart. This helps keep swelling down. Â· Talk to your doctor if you are in pain. Ask what kind of over-the-counter medication you can take for pain. Â· Apply clean bandages as advised. Â· Take your temperature once a day for a week. Â· Wash your hands often to prevent spreading the infection. In the future, wash your hands before and after you touch cuts, scratches, or bandages. This will help prevent infection. Â   When to Call Your Doctor  Call your doctor immediately if you have any of the following:  Â· Vomiting  Â· FeverÂ of100.4Â°F (38Â°C) or higher, or as directed by your health care provider  Â· Shaking chills  Â· Redness that gets worse in or around the infected area  Â· Swelling of the infected area  Â· Pain that gets worse in or around the infected area  Â· Difficulty or pain when moving the joints above or below the infected area  Â· Discharge or pus draining from the area   Â© 6288-4855 The [address], [facility] [address] All rights reserved.  This Pt resting comfortably. information is not intended as a substitute for professional medical care. Always follow your healthcare professional's instructions.

## 2022-08-24 NOTE — ED NOTES
Called pt's parents to inform them that [facility] had deflected the pt but [facility] said they would review again.

## 2022-08-24 NOTE — ED NOTES
Writer called [facility] for an update. Spoke with Thalia. She stated that pt has been deflected because he does not meet criteria.
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
## 2022-08-25 NOTE — ED QUICK NOTES
Spoke to [facility] from [address] [place] [address]. Facility is requesting med list from [facility] to review. Please call [facility] at [phone] with another update on pending med list in AM. Facility states if pt is accepted it may be around late afternoon or early evening.

## 2022-08-25 NOTE — ED QUICK NOTES
I spoke with Kimmy Bahena at [facility]. Accepting doctor Dr. Kenia Rosales.   Patient is able to go to admissions, to be picked up from this ER at 1700

## 2022-08-25 NOTE — ED NOTES
Spoke with Mary Olivares to again request med list to be faxed.  Facility to follow up with request as soon as possible

## 2022-08-25 NOTE — ED NOTES
Krunal is requesting the CBC, UDS, and a med list from the [facility]. The CBC and UDS were faxed to [address] [place] [address].  ER RN states she will call the [facility] to request the med list.
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
## 2022-08-25 NOTE — ED QUICK NOTES
Report received, sitter at bedside.   Patient awake, breakfast tray at bedside, sitter assisting the patient

## 2022-08-25 NOTE — ED NOTES
Pt's mother called for update regarding transfer - advised [facility] reviewing and stated that they will let us know if they are able to accept sometime later this afternoon. Will follow up with site to send requested updated med list and confirm if anything else is needed later this morning.

## 2022-08-25 NOTE — ED QUICK NOTES
Darshana left for group home in attempt to retrieve home med list. [facility] group home number [phone].

## 2022-08-25 NOTE — ED NOTES
Josephine Hernandez is awake and is unable to talk  Or process. Josephine Hernandez is asking repeatedly Chi Martinez is for dinner. \"  Staff has been reminding him he just finished with lunch and it is not time for dinner.

## 2022-08-25 NOTE — ED QUICK NOTES
All requested information was faxed to [address] [place] [address]  POS, CBC, Urine drug screen.

## 2022-08-25 NOTE — ED QUICK NOTES
Shalini Gibbs from [facility] was given nurse to nurse report, she will review some things, call the patient's home, and call us back
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
## 2022-08-25 NOTE — ED NOTES
This writer has not been able to meet with Jessica Sadler as he still appears to be sleeping with sitter in arms reach.

## 2023-04-18 ENCOUNTER — HOSPITAL ENCOUNTER (INPATIENT)
Facility: HOSPITAL | Age: 28
LOS: 14 days | Discharge: OTHER INSTITUTION | End: 2023-05-02
Attending: EMERGENCY MEDICINE | Admitting: HOSPITALIST
Payer: COMMERCIAL

## 2023-04-18 DIAGNOSIS — U07.1 COVID-19: Primary | ICD-10-CM

## 2023-04-18 DIAGNOSIS — R46.89 AGGRESSIVE BEHAVIOR: ICD-10-CM

## 2023-04-18 LAB
ALBUMIN SERPL-MCNC: 3.3 G/DL (ref 3.4–5)
ALBUMIN/GLOB SERPL: 1 {RATIO} (ref 1–2)
ALP LIVER SERPL-CCNC: 47 U/L
ALT SERPL-CCNC: 45 U/L
AMPHET UR QL SCN: NEGATIVE
ANION GAP SERPL CALC-SCNC: 2 MMOL/L (ref 0–18)
AST SERPL-CCNC: 23 U/L (ref 15–37)
BASOPHILS # BLD AUTO: 0.03 X10(3) UL (ref 0–0.2)
BASOPHILS NFR BLD AUTO: 0.4 %
BENZODIAZ UR QL SCN: NEGATIVE
BILIRUB SERPL-MCNC: 0.4 MG/DL (ref 0.1–2)
BILIRUB UR QL STRIP.AUTO: NEGATIVE
BUN BLD-MCNC: 13 MG/DL (ref 7–18)
CALCIUM BLD-MCNC: 8.6 MG/DL (ref 8.5–10.1)
CANNABINOIDS UR QL SCN: NEGATIVE
CHLORIDE SERPL-SCNC: 109 MMOL/L (ref 98–112)
CO2 SERPL-SCNC: 26 MMOL/L (ref 21–32)
COCAINE UR QL: NEGATIVE
COLOR UR AUTO: YELLOW
CREAT BLD-MCNC: 0.91 MG/DL
CREAT UR-SCNC: 293 MG/DL
EOSINOPHIL # BLD AUTO: 0.05 X10(3) UL (ref 0–0.7)
EOSINOPHIL NFR BLD AUTO: 0.7 %
ERYTHROCYTE [DISTWIDTH] IN BLOOD BY AUTOMATED COUNT: 13.2 %
ETHANOL SERPL-MCNC: <3 MG/DL (ref ?–3)
GFR SERPLBLD BASED ON 1.73 SQ M-ARVRAT: 117 ML/MIN/1.73M2 (ref 60–?)
GLOBULIN PLAS-MCNC: 3.3 G/DL (ref 2.8–4.4)
GLUCOSE BLD-MCNC: 113 MG/DL (ref 70–99)
GLUCOSE UR STRIP.AUTO-MCNC: NEGATIVE MG/DL
HCT VFR BLD AUTO: 43.7 %
HGB BLD-MCNC: 15.4 G/DL
IMM GRANULOCYTES # BLD AUTO: 0.02 X10(3) UL (ref 0–1)
IMM GRANULOCYTES NFR BLD: 0.3 %
KETONES UR STRIP.AUTO-MCNC: NEGATIVE MG/DL
LEUKOCYTE ESTERASE UR QL STRIP.AUTO: NEGATIVE
LYMPHOCYTES # BLD AUTO: 1.32 X10(3) UL (ref 1–4)
LYMPHOCYTES NFR BLD AUTO: 17.5 %
MCH RBC QN AUTO: 32 PG (ref 26–34)
MCHC RBC AUTO-ENTMCNC: 35.2 G/DL (ref 31–37)
MCV RBC AUTO: 90.7 FL
MONOCYTES # BLD AUTO: 0.67 X10(3) UL (ref 0.1–1)
MONOCYTES NFR BLD AUTO: 8.9 %
NEUTROPHILS # BLD AUTO: 5.45 X10 (3) UL (ref 1.5–7.7)
NEUTROPHILS # BLD AUTO: 5.45 X10(3) UL (ref 1.5–7.7)
NEUTROPHILS NFR BLD AUTO: 72.2 %
NITRITE UR QL STRIP.AUTO: NEGATIVE
OPIATES UR QL SCN: NEGATIVE
OSMOLALITY SERPL CALC.SUM OF ELEC: 285 MOSM/KG (ref 275–295)
OXYCODONE UR QL SCN: NEGATIVE
PH UR STRIP.AUTO: 6 [PH] (ref 5–8)
PLATELET # BLD AUTO: 215 10(3)UL (ref 150–450)
POTASSIUM SERPL-SCNC: 4.1 MMOL/L (ref 3.5–5.1)
PROT SERPL-MCNC: 6.6 G/DL (ref 6.4–8.2)
PROT UR STRIP.AUTO-MCNC: NEGATIVE MG/DL
RBC # BLD AUTO: 4.82 X10(6)UL
RBC UR QL AUTO: NEGATIVE
SARS-COV-2 RNA RESP QL NAA+PROBE: DETECTED
SODIUM SERPL-SCNC: 137 MMOL/L (ref 136–145)
SP GR UR STRIP.AUTO: 1.02 (ref 1–1.03)
UROBILINOGEN UR STRIP.AUTO-MCNC: <2 MG/DL
WBC # BLD AUTO: 7.5 X10(3) UL (ref 4–11)

## 2023-04-18 PROCEDURE — 99222 1ST HOSP IP/OBS MODERATE 55: CPT | Performed by: INTERNAL MEDICINE

## 2023-04-18 PROCEDURE — 90792 PSYCH DIAG EVAL W/MED SRVCS: CPT | Performed by: PHYSICIAN ASSISTANT

## 2023-04-18 RX ORDER — BISACODYL 10 MG
10 SUPPOSITORY, RECTAL RECTAL
Status: DISCONTINUED | OUTPATIENT
Start: 2023-04-18 | End: 2023-05-02

## 2023-04-18 RX ORDER — POLYETHYLENE GLYCOL 3350 17 G/17G
17 POWDER, FOR SOLUTION ORAL DAILY PRN
Status: DISCONTINUED | OUTPATIENT
Start: 2023-04-18 | End: 2023-04-23

## 2023-04-18 RX ORDER — TRAZODONE HYDROCHLORIDE 100 MG/1
100 TABLET ORAL NIGHTLY
Status: DISCONTINUED | OUTPATIENT
Start: 2023-04-18 | End: 2023-05-02

## 2023-04-18 RX ORDER — SODIUM PHOSPHATE, DIBASIC AND SODIUM PHOSPHATE, MONOBASIC 7; 19 G/133ML; G/133ML
1 ENEMA RECTAL ONCE AS NEEDED
Status: DISCONTINUED | OUTPATIENT
Start: 2023-04-18 | End: 2023-05-02

## 2023-04-18 RX ORDER — ONDANSETRON 2 MG/ML
4 INJECTION INTRAMUSCULAR; INTRAVENOUS EVERY 6 HOURS PRN
Status: DISCONTINUED | OUTPATIENT
Start: 2023-04-18 | End: 2023-05-02

## 2023-04-18 RX ORDER — MELATONIN
3 NIGHTLY PRN
Status: DISCONTINUED | OUTPATIENT
Start: 2023-04-18 | End: 2023-05-02

## 2023-04-18 RX ORDER — DIVALPROEX SODIUM 125 MG/1
750 CAPSULE, COATED PELLETS ORAL EVERY 12 HOURS SCHEDULED
Status: DISCONTINUED | OUTPATIENT
Start: 2023-04-18 | End: 2023-05-02

## 2023-04-18 RX ORDER — GABAPENTIN 100 MG/1
100 CAPSULE ORAL 3 TIMES DAILY
Status: DISCONTINUED | OUTPATIENT
Start: 2023-04-18 | End: 2023-05-02

## 2023-04-18 RX ORDER — ACETAMINOPHEN 500 MG
500 TABLET ORAL EVERY 4 HOURS PRN
Status: DISCONTINUED | OUTPATIENT
Start: 2023-04-18 | End: 2023-05-02

## 2023-04-18 RX ORDER — PROCHLORPERAZINE EDISYLATE 5 MG/ML
5 INJECTION INTRAMUSCULAR; INTRAVENOUS EVERY 8 HOURS PRN
Status: DISCONTINUED | OUTPATIENT
Start: 2023-04-18 | End: 2023-05-02

## 2023-04-18 RX ORDER — NALTREXONE HYDROCHLORIDE 50 MG/1
100 TABLET, FILM COATED ORAL NIGHTLY
Status: DISCONTINUED | OUTPATIENT
Start: 2023-04-18 | End: 2023-05-02

## 2023-04-18 RX ORDER — ENOXAPARIN SODIUM 100 MG/ML
40 INJECTION SUBCUTANEOUS DAILY
Status: DISCONTINUED | OUTPATIENT
Start: 2023-04-19 | End: 2023-05-02

## 2023-04-18 RX ORDER — SENNOSIDES 8.6 MG
17.2 TABLET ORAL NIGHTLY PRN
Status: DISCONTINUED | OUTPATIENT
Start: 2023-04-18 | End: 2023-04-23

## 2023-04-18 RX ORDER — OLANZAPINE 5 MG/1
15 TABLET ORAL 2 TIMES DAILY
Status: DISCONTINUED | OUTPATIENT
Start: 2023-04-18 | End: 2023-05-02

## 2023-04-18 RX ORDER — ACETAMINOPHEN 500 MG
1000 TABLET ORAL ONCE
Status: COMPLETED | OUTPATIENT
Start: 2023-04-18 | End: 2023-04-18

## 2023-04-18 RX ORDER — BENZTROPINE MESYLATE 2 MG/1
2 TABLET ORAL NIGHTLY
Status: DISCONTINUED | OUTPATIENT
Start: 2023-04-18 | End: 2023-05-02

## 2023-04-18 RX ORDER — CLOMIPRAMINE HYDROCHLORIDE 25 MG/1
25 CAPSULE ORAL NIGHTLY
Status: DISCONTINUED | OUTPATIENT
Start: 2023-04-18 | End: 2023-05-02

## 2023-04-18 NOTE — ED QUICK NOTES
Orders for admission, patient is aware of plan and ready to go upstairs. Any questions, please call ED RN Tristan Martines at extension [phone].      Patient Covid vaccination status: Fully vaccinated     COVID Test Ordered in ED: SARS-CoV-2 by PCR (GENEXPERT)    COVID Suspicion at Admission: N/A    Running Infusions:  None    Mental Status/LOC at time of transport: A&Ox4    Other pertinent information:   CIWA score: N/A   NIH score:  N/A
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
## 2023-04-18 NOTE — ED NOTES
What is your service agency? [address] [place] [address], [address], [place], [address] [place] [address] Rd; [phone]  during office hours; [phone] after hours/crisis     [facility]     Who is your ? Jonathan Pope     What is the phone number? [phone]    What services are in place or needed? Describe frequency/status as applicable. In-Home Supports to help with independent living  Respite Care to provide temporary relief to caregivers  Residential Living Arrangements to provide needed care and security  Adaptive Equipment to make life more independent    Is the patient enrolled in the [address] [place] [address] (Prioritization of Urgency of Need for Services)? Unsure at this time     Who is your ? Unsure at this time     What is the phone number?  Unsure at this time

## 2023-04-18 NOTE — ED QUICK NOTES
Carleton Merlin from [address] [place] [address] stating pts meds have been reconciled but one of his medications is non-formulary and a [facility] home staff member will be bringing it to the ED so he has it during his admissions. The medication is Vraylar (cariprazine) 3mg PO Qday.   Parents and Home facility have been updated/support of plan of care

## 2023-04-18 NOTE — ED QUICK NOTES
Pt taken to private room for evaluation by [facility] ER staff.
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
## 2023-04-18 NOTE — ED QUICK NOTES
Patient forcing himself to throw up after eating lunch. Patient redirected and told to stop making himself throw up.
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
## 2023-04-19 PROCEDURE — 90792 PSYCH DIAG EVAL W/MED SRVCS: CPT | Performed by: PHYSICIAN ASSISTANT

## 2023-04-19 PROCEDURE — 99231 SBSQ HOSP IP/OBS SF/LOW 25: CPT | Performed by: HOSPITALIST

## 2023-04-19 RX ORDER — LEVOTHYROXINE SODIUM 0.07 MG/1
75 TABLET ORAL
Status: DISCONTINUED | OUTPATIENT
Start: 2023-04-19 | End: 2023-05-02

## 2023-04-19 NOTE — PLAN OF CARE
NURSING ADMISSION NOTE      Patient admitted via Cart  Oriented to room. Safety precautions initiated. Bed in low position. Call light in reach. Admission navigator completed by this RN, vitals obtained, head to toe assessment complete. A&Ox4  RA  No tele monitoring   Continent, up ad pat  Regular diet  Denies pain/N/V  Medicated per [facility]  Resting comfortably, all needs met at this time. Problem: SAFETY ADULT - FALL  Goal: Free from fall injury  Description: INTERVENTIONS:  - Assess pt frequently for physical needs  - Identify cognitive and physical deficits and behaviors that affect risk of falls.   - [place] fall precautions as indicated by assessment.  - Educate pt/family on patient safety including physical limitations  - Instruct pt to call for assistance with activity based on assessment  - Modify environment to reduce risk of injury  - Provide assistive devices as appropriate  - Consider OT/PT consult to assist with strengthening/mobility  - Encourage toileting schedule  Outcome: Progressing     Problem: DISCHARGE PLANNING  Goal: Discharge to home or other facility with appropriate resources  Description: INTERVENTIONS:  - Identify barriers to discharge w/pt and caregiver  - Include patient/family/discharge partner in discharge planning  - Arrange for needed discharge resources and transportation as appropriate  - Identify discharge learning needs (meds, wound care, etc)  - Arrange for interpreters to assist at discharge as needed  - Consider post-discharge preferences of patient/family/discharge partner  - Complete POLST form as appropriate  - Assess patient's ability to be responsible for managing their own health  - Refer to Case Management Department for coordinating discharge planning if the patient needs post-hospital services based on physician/LIP order or complex needs related to functional status, cognitive ability or social support system  Outcome: Progressing     Problem: Agitation/Aggression  Goal: Short Term Goal  Description:     Interventions:  - Pt will refrain from harming staff  - Pt will make some effort to stop behavior      Responsible professional:         See specialized active groups intervention below.     Outcome: Progressing

## 2023-04-19 NOTE — PLAN OF CARE
Assumed care of 31 y/o male @54, A/Ox3/4  Covid positive, asymptomatic-airborne prec  Pt is autistic, hx: aggression, bipolar, ADHD  RA, no tele, continent, ad pat  V/S/S  Regular diet  Psych following  Will continue to monitor           Problem: Agitation/Aggression  Goal: Short Term Goal  Description:     Interventions:  - Pt will refrain from harming staff  - Pt will make some effort to stop behavior      Responsible professional:         See specialized active groups intervention below.     4/19/2023 1711 by Madan Vera RN  Outcome: Progressing  4/19/2023 1711 by Madan Vera RN  Outcome: Progressing

## 2023-04-20 PROCEDURE — 99231 SBSQ HOSP IP/OBS SF/LOW 25: CPT | Performed by: HOSPITALIST

## 2023-04-20 PROCEDURE — 99232 SBSQ HOSP IP/OBS MODERATE 35: CPT

## 2023-04-20 NOTE — PLAN OF CARE
A/o x 4. RA. No Tele. + covid. Asymptomatic. Awaiting placement. No complaints of pain or distress. Patient in good mood today. Patient has great appetite but should be redirected when he wants too many meals. Problem: Patient/Family Goals  Goal: Patient/Family Long Term Goal  Description: Patient's Long Term Goal: be discharged     Interventions:  - follow plan of care.    - See additional Care Plan goals for specific interventions  Outcome: Progressing  Goal: Patient/Family Short Term Goal  Description: Patient's Short Term Goal: manage COVID    Interventions:   - follow plan of care  - See additional Care Plan goals for specific interventions  Outcome: Progressing

## 2023-04-20 NOTE — PROGRESS NOTES
A+Ox4  RA  No tele  (+) covid, asymptomatic  Ambulates independently  Awaiting placement  No c/o pain or s/s of distress  Will continue to monitor.

## 2023-04-21 LAB
GLUCOSE BLD-MCNC: 152 MG/DL (ref 70–99)
GLUCOSE BLD-MCNC: 75 MG/DL (ref 70–99)
GLUCOSE BLD-MCNC: 86 MG/DL (ref 70–99)

## 2023-04-21 PROCEDURE — 99232 SBSQ HOSP IP/OBS MODERATE 35: CPT

## 2023-04-21 PROCEDURE — 99231 SBSQ HOSP IP/OBS SF/LOW 25: CPT | Performed by: HOSPITALIST

## 2023-04-21 NOTE — PROGRESS NOTES
A+Ox4  RA  No tele  Ambulates independently  Took shower in evening  covid (+), asymptomatic  Takes pills whole with apple sauce  Awaiting placement  Safety precautions in place  Will continue to monitor.

## 2023-04-21 NOTE — PLAN OF CARE
Assumed pt care this morning at 0730. Pt is A&OX4, following commands. COVID positive, Asymptomatic. RA,VSS  No tele  Pt is ambulatory. Pills whole with applesauce. Reg. Diet, tolerating diet. Awaiting placement. Bed in the lowest position, call light in reach. Problem: SAFETY ADULT - FALL  Goal: Free from fall injury  Description: INTERVENTIONS:  - Assess pt frequently for physical needs  - Identify cognitive and physical deficits and behaviors that affect risk of falls.   - [place] fall precautions as indicated by assessment.  - Educate pt/family on patient safety including physical limitations  - Instruct pt to call for assistance with activity based on assessment  - Modify environment to reduce risk of injury  - Provide assistive devices as appropriate  - Consider OT/PT consult to assist with strengthening/mobility  - Encourage toileting schedule  Outcome: Progressing     Problem: DISCHARGE PLANNING  Goal: Discharge to home or other facility with appropriate resources  Description: INTERVENTIONS:  - Identify barriers to discharge w/pt and caregiver  - Include patient/family/discharge partner in discharge planning  - Arrange for needed discharge resources and transportation as appropriate  - Identify discharge learning needs (meds, wound care, etc)  - Arrange for interpreters to assist at discharge as needed  - Consider post-discharge preferences of patient/family/discharge partner  - Complete POLST form as appropriate  - Assess patient's ability to be responsible for managing their own health  - Refer to Case Management Department for coordinating discharge planning if the patient needs post-hospital services based on physician/LIP order or complex needs related to functional status, cognitive ability or social support system  Outcome: Progressing     Problem: Agitation/Aggression  Goal: Short Term Goal  Description:     Interventions:  - Pt will refrain from harming staff  - Pt will make some effort to stop behavior      Responsible professional:         See specialized active groups intervention below.     Outcome: Progressing

## 2023-04-21 NOTE — BH PROGRESS NOTE
Called and spoke to the patients father and mother, Betibill Nunn and Nanci Pedraza. They were asked if there is anything this [facility] can help them with for discharge planning. They said, that the center their son has been in, the [facility] is helping them and also [facility] in [place]. They said, they are looking into the patient going to a [address]. They said, they didn't need any assistance but thanked this writer for reaching out to them. They were given my number and informed to call if any questions or help in getting their son services.

## 2023-04-22 ENCOUNTER — APPOINTMENT (OUTPATIENT)
Dept: GENERAL RADIOLOGY | Facility: HOSPITAL | Age: 28
End: 2023-04-22
Attending: HOSPITALIST
Payer: COMMERCIAL

## 2023-04-22 LAB
ALBUMIN SERPL-MCNC: 3.4 G/DL (ref 3.4–5)
ALBUMIN/GLOB SERPL: 0.9 {RATIO} (ref 1–2)
ALP LIVER SERPL-CCNC: 49 U/L
ALT SERPL-CCNC: 55 U/L
ANION GAP SERPL CALC-SCNC: 4 MMOL/L (ref 0–18)
AST SERPL-CCNC: 37 U/L (ref 15–37)
BASOPHILS # BLD AUTO: 0.06 X10(3) UL (ref 0–0.2)
BASOPHILS NFR BLD AUTO: 0.9 %
BILIRUB SERPL-MCNC: 0.3 MG/DL (ref 0.1–2)
BUN BLD-MCNC: 16 MG/DL (ref 7–18)
CALCIUM BLD-MCNC: 9 MG/DL (ref 8.5–10.1)
CHLORIDE SERPL-SCNC: 107 MMOL/L (ref 98–112)
CO2 SERPL-SCNC: 27 MMOL/L (ref 21–32)
CREAT BLD-MCNC: 0.93 MG/DL
EOSINOPHIL # BLD AUTO: 0.11 X10(3) UL (ref 0–0.7)
EOSINOPHIL NFR BLD AUTO: 1.6 %
ERYTHROCYTE [DISTWIDTH] IN BLOOD BY AUTOMATED COUNT: 13 %
GFR SERPLBLD BASED ON 1.73 SQ M-ARVRAT: 114 ML/MIN/1.73M2 (ref 60–?)
GLOBULIN PLAS-MCNC: 3.7 G/DL (ref 2.8–4.4)
GLUCOSE BLD-MCNC: 119 MG/DL (ref 70–99)
GLUCOSE BLD-MCNC: 71 MG/DL (ref 70–99)
HCT VFR BLD AUTO: 46.1 %
HGB BLD-MCNC: 15.6 G/DL
IMM GRANULOCYTES # BLD AUTO: 0.04 X10(3) UL (ref 0–1)
IMM GRANULOCYTES NFR BLD: 0.6 %
LYMPHOCYTES # BLD AUTO: 1.96 X10(3) UL (ref 1–4)
LYMPHOCYTES NFR BLD AUTO: 28.4 %
MAGNESIUM SERPL-MCNC: 1.6 MG/DL (ref 1.6–2.6)
MCH RBC QN AUTO: 31.8 PG (ref 26–34)
MCHC RBC AUTO-ENTMCNC: 33.8 G/DL (ref 31–37)
MCV RBC AUTO: 94.1 FL
MONOCYTES # BLD AUTO: 0.66 X10(3) UL (ref 0.1–1)
MONOCYTES NFR BLD AUTO: 9.6 %
NEUTROPHILS # BLD AUTO: 4.07 X10 (3) UL (ref 1.5–7.7)
NEUTROPHILS # BLD AUTO: 4.07 X10(3) UL (ref 1.5–7.7)
NEUTROPHILS NFR BLD AUTO: 58.9 %
OSMOLALITY SERPL CALC.SUM OF ELEC: 288 MOSM/KG (ref 275–295)
PLATELET # BLD AUTO: 243 10(3)UL (ref 150–450)
POTASSIUM SERPL-SCNC: 4.2 MMOL/L (ref 3.5–5.1)
PROT SERPL-MCNC: 7.1 G/DL (ref 6.4–8.2)
RBC # BLD AUTO: 4.9 X10(6)UL
SODIUM SERPL-SCNC: 138 MMOL/L (ref 136–145)
WBC # BLD AUTO: 6.9 X10(3) UL (ref 4–11)

## 2023-04-22 PROCEDURE — 99232 SBSQ HOSP IP/OBS MODERATE 35: CPT | Performed by: PHYSICIAN ASSISTANT

## 2023-04-22 PROCEDURE — 99232 SBSQ HOSP IP/OBS MODERATE 35: CPT | Performed by: HOSPITALIST

## 2023-04-22 PROCEDURE — 74018 RADEX ABDOMEN 1 VIEW: CPT | Performed by: HOSPITALIST

## 2023-04-22 RX ORDER — MAGNESIUM OXIDE 400 MG/1
400 TABLET ORAL ONCE
Status: COMPLETED | OUTPATIENT
Start: 2023-04-22 | End: 2023-04-22

## 2023-04-22 RX ORDER — DOCUSATE SODIUM 100 MG/1
100 CAPSULE, LIQUID FILLED ORAL 2 TIMES DAILY
Status: DISCONTINUED | OUTPATIENT
Start: 2023-04-22 | End: 2023-05-02

## 2023-04-22 NOTE — PLAN OF CARE
A&Ox4  RA  No tele monitoring  Continent, up ad pat  General diet, pills whole with applesauce  Denies pain  Showered this evening  Medicated per [facility]  Call light within reach, bed in lowest position  All needs met at this time      Problem: SAFETY ADULT - FALL  Goal: Free from fall injury  Description: INTERVENTIONS:  - Assess pt frequently for physical needs  - Identify cognitive and physical deficits and behaviors that affect risk of falls.   - [place] fall precautions as indicated by assessment.  - Educate pt/family on patient safety including physical limitations  - Instruct pt to call for assistance with activity based on assessment  - Modify environment to reduce risk of injury  - Provide assistive devices as appropriate  - Consider OT/PT consult to assist with strengthening/mobility  - Encourage toileting schedule  Outcome: Progressing     Problem: DISCHARGE PLANNING  Goal: Discharge to home or other facility with appropriate resources  Description: INTERVENTIONS:  - Identify barriers to discharge w/pt and caregiver  - Include patient/family/discharge partner in discharge planning  - Arrange for needed discharge resources and transportation as appropriate  - Identify discharge learning needs (meds, wound care, etc)  - Arrange for interpreters to assist at discharge as needed  - Consider post-discharge preferences of patient/family/discharge partner  - Complete POLST form as appropriate  - Assess patient's ability to be responsible for managing their own health  - Refer to Case Management Department for coordinating discharge planning if the patient needs post-hospital services based on physician/LIP order or complex needs related to functional status, cognitive ability or social support system  Outcome: Progressing     Problem: Agitation/Aggression  Goal: Short Term Goal  Description:     Interventions:  - Pt will refrain from harming staff  - Pt will make some effort to stop behavior    Responsible professional:     See specialized active groups intervention below.     Outcome: Progressing

## 2023-04-22 NOTE — PLAN OF CARE
Pt Aox4, autistic. Calm and cooperative behavior. Room air, no tele monitoring. No peripheral IV. Regular diet, pills in applesauce. Pt ordered breakfast, took medication, and showered. He later reported to physician that he vomited, KUB ordered. Pt up a pat in his room. Covid+ , asymptomatic. Isolation precautions in place. Call light within reach. Discharge pending placement.        Problem: DISCHARGE PLANNING  Goal: Discharge to home or other facility with appropriate resources  Description: INTERVENTIONS:  - Identify barriers to discharge w/pt and caregiver  - Include patient/family/discharge partner in discharge planning  - Arrange for needed discharge resources and transportation as appropriate  - Identify discharge learning needs (meds, wound care, etc)  - Arrange for interpreters to assist at discharge as needed  - Consider post-discharge preferences of patient/family/discharge partner  - Complete POLST form as appropriate  - Assess patient's ability to be responsible for managing their own health  - Refer to Case Management Department for coordinating discharge planning if the patient needs post-hospital services based on physician/LIP order or complex needs related to functional status, cognitive ability or social support system  Outcome: Progressing

## 2023-04-23 LAB — MAGNESIUM SERPL-MCNC: 1.8 MG/DL (ref 1.6–2.6)

## 2023-04-23 PROCEDURE — 99232 SBSQ HOSP IP/OBS MODERATE 35: CPT | Performed by: PHYSICIAN ASSISTANT

## 2023-04-23 PROCEDURE — 99232 SBSQ HOSP IP/OBS MODERATE 35: CPT | Performed by: HOSPITALIST

## 2023-04-23 RX ORDER — MAGNESIUM OXIDE 400 MG/1
400 TABLET ORAL ONCE
Status: COMPLETED | OUTPATIENT
Start: 2023-04-23 | End: 2023-04-23

## 2023-04-23 RX ORDER — SENNOSIDES 8.6 MG
8.6 TABLET ORAL 2 TIMES DAILY
Status: DISCONTINUED | OUTPATIENT
Start: 2023-04-23 | End: 2023-05-02

## 2023-04-23 RX ORDER — LACTULOSE 10 G/15ML
20 SOLUTION ORAL ONCE
Status: COMPLETED | OUTPATIENT
Start: 2023-04-23 | End: 2023-04-23

## 2023-04-23 RX ORDER — POLYETHYLENE GLYCOL 3350 17 G/17G
17 POWDER, FOR SOLUTION ORAL DAILY
Status: DISCONTINUED | OUTPATIENT
Start: 2023-04-23 | End: 2023-04-24

## 2023-04-23 RX ORDER — BISACODYL 10 MG
10 SUPPOSITORY, RECTAL RECTAL ONCE
Status: COMPLETED | OUTPATIENT
Start: 2023-04-23 | End: 2023-04-23

## 2023-04-23 NOTE — PLAN OF CARE
Pt on isolation for Covid. No tele, room air, VSS. Xray abdomen revealed stool. Medicated per MAR (Miralax, senna, lactulose, suppository). Pt instructed to let RN know when he has a bowel movement. Will continue to monitor, pt feels a little distended but does not report discomfort. Fruits with fiber added to his meals. Placement pending. Continue to monitor.

## 2023-04-23 NOTE — PLAN OF CARE
Assumed care at 1930  A&Ox4, pleasant, cooperative  RA  No tele monitoring  General diet, continent, up ad pat  Denies pain  Medicated per [facility]  PRN miralax   Call light within reach, bed in lowest position  All needs met at this time      Problem: Patient/Family Goals  Goal: Patient/Family Long Term Goal  Description: Patient's Long Term Goal: Discharge to appropriate facility    Interventions:  - monitor behavior  - continue psychiatric medications    - See additional Care Plan goals for specific interventions  Outcome: Progressing  Goal: Patient/Family Short Term Goal  Description: Patient's Short Term Goal: remain asymptomatic for covid    Interventions:   - monitor symptoms  - Isolation  - See additional Care Plan goals for specific interventions  Outcome: Progressing     Problem: SAFETY ADULT - FALL  Goal: Free from fall injury  Description: INTERVENTIONS:  - Assess pt frequently for physical needs  - Identify cognitive and physical deficits and behaviors that affect risk of falls.   - [place] fall precautions as indicated by assessment.  - Educate pt/family on patient safety including physical limitations  - Instruct pt to call for assistance with activity based on assessment  - Modify environment to reduce risk of injury  - Provide assistive devices as appropriate  - Consider OT/PT consult to assist with strengthening/mobility  - Encourage toileting schedule  Outcome: Progressing     Problem: DISCHARGE PLANNING  Goal: Discharge to home or other facility with appropriate resources  Description: INTERVENTIONS:  - Identify barriers to discharge w/pt and caregiver  - Include patient/family/discharge partner in discharge planning  - Arrange for needed discharge resources and transportation as appropriate  - Identify discharge learning needs (meds, wound care, etc)  - Arrange for interpreters to assist at discharge as needed  - Consider post-discharge preferences of patient/family/discharge partner  - Complete POLST form as appropriate  - Assess patient's ability to be responsible for managing their own health  - Refer to Case Management Department for coordinating discharge planning if the patient needs post-hospital services based on physician/LIP order or complex needs related to functional status, cognitive ability or social support system  Outcome: Progressing     Problem: Agitation/Aggression  Goal: Short Term Goal  Description:     Interventions:  - Pt will refrain from harming staff  - Pt will make some effort to stop behavior      Responsible professional:         See specialized active groups intervention below.     Outcome: Progressing

## 2023-04-24 LAB — MAGNESIUM SERPL-MCNC: 1.7 MG/DL (ref 1.6–2.6)

## 2023-04-24 PROCEDURE — 99232 SBSQ HOSP IP/OBS MODERATE 35: CPT | Performed by: PHYSICIAN ASSISTANT

## 2023-04-24 PROCEDURE — 99231 SBSQ HOSP IP/OBS SF/LOW 25: CPT | Performed by: HOSPITALIST

## 2023-04-24 RX ORDER — MAGNESIUM OXIDE 400 MG/1
400 TABLET ORAL ONCE
Status: COMPLETED | OUTPATIENT
Start: 2023-04-24 | End: 2023-04-24

## 2023-04-24 RX ORDER — LACTULOSE 10 G/15ML
20 SOLUTION ORAL ONCE
Status: DISCONTINUED | OUTPATIENT
Start: 2023-04-24 | End: 2023-04-24

## 2023-04-24 NOTE — PLAN OF CARE
Problem: COVID, placement needed  Data: Patient alert and oriented overnight, on room air, denies pain. Patient fixated on meals, frequently letting caregiver know what his orders will be for next day meals. Patient remains COVID symptom free, up in room as tolerated, voiding freely, vital signs stable. Action: Due medications given, all patient's needs attended to. Education (patient/family): Patient updated on plan of care. Placement needed. Response: Patient verbalizes understanding of plan of care and appears to be resting comfortably at this time, will continue to monitor.      Problem: Patient/Family Goals  Goal: Patient/Family Long Term Goal  Description: Patient's Long Term Goal: Discharge to appropriate facility    Interventions:  - monitor behavior  - continue psychiatric medications    - See additional Care Plan goals for specific interventions  Outcome: Progressing  Goal: Patient/Family Short Term Goal  Description: Patient's Short Term Goal: remain asymptomatic for covid    Interventions:   - monitor symptoms  - Isolation  - See additional Care Plan goals for specific interventions  Outcome: Progressing     Problem: RESPIRATORY - ADULT  Goal: Achieves optimal ventilation and oxygenation  Description: INTERVENTIONS:  - Assess for changes in respiratory status  - Assess for changes in mentation and behavior  - Position to facilitate oxygenation and minimize respiratory effort  - Oxygen supplementation based on oxygen saturation or ABGs  - Provide Smoking Cessation handout, if applicable  - Encourage broncho-pulmonary hygiene including cough, deep breathe, Incentive Spirometry  - Assess the need for suctioning and perform as needed  - Assess and instruct to report SOB or any respiratory difficulty  - Respiratory Therapy support as indicated  - Manage/alleviate anxiety  - Monitor for signs/symptoms of CO2 retention  Outcome: Progressing

## 2023-04-24 NOTE — PLAN OF CARE
Assumed patient care at 0730  Aox4, RA, VSS  No tele  Independent  Airborne and Contact precautions: COVID+  Hyperfixated on meals and medication times. Lab draw  Regular diet  No IV access and no fluids running  Patient reports no pain; no n/v/d  Safety precautions in place  Bed in lowest position and call light within reach  All patient needs met at this time. Problem: Patient/Family Goals  Goal: Patient/Family Long Term Goal  Description: Patient's Long Term Goal: Discharge to appropriate facility    Interventions:  - monitor behavior  - continue psychiatric medications    - See additional Care Plan goals for specific interventions  Outcome: Progressing  Goal: Patient/Family Short Term Goal  Description: Patient's Short Term Goal: remain asymptomatic for covid    Interventions:   - monitor symptoms  - Isolation  - See additional Care Plan goals for specific interventions  Outcome: Progressing     Problem: SAFETY ADULT - FALL  Goal: Free from fall injury  Description: INTERVENTIONS:  - Assess pt frequently for physical needs  - Identify cognitive and physical deficits and behaviors that affect risk of falls.   - [place] fall precautions as indicated by assessment.  - Educate pt/family on patient safety including physical limitations  - Instruct pt to call for assistance with activity based on assessment  - Modify environment to reduce risk of injury  - Provide assistive devices as appropriate  - Consider OT/PT consult to assist with strengthening/mobility  - Encourage toileting schedule  Outcome: Progressing     Problem: DISCHARGE PLANNING  Goal: Discharge to home or other facility with appropriate resources  Description: INTERVENTIONS:  - Identify barriers to discharge w/pt and caregiver  - Include patient/family/discharge partner in discharge planning  - Arrange for needed discharge resources and transportation as appropriate  - Identify discharge learning needs (meds, wound care, etc)  - Arrange for interpreters to assist at discharge as needed  - Consider post-discharge preferences of patient/family/discharge partner  - Complete POLST form as appropriate  - Assess patient's ability to be responsible for managing their own health  - Refer to Case Management Department for coordinating discharge planning if the patient needs post-hospital services based on physician/LIP order or complex needs related to functional status, cognitive ability or social support system  Outcome: Progressing     Problem: Agitation/Aggression  Goal: Short Term Goal  Description:     Interventions:  - Pt will refrain from harming staff  - Pt will make some effort to stop behavior      Responsible professional:         See specialized active groups intervention below.     Outcome: Progressing     Problem: RESPIRATORY - ADULT  Goal: Achieves optimal ventilation and oxygenation  Description: INTERVENTIONS:  - Assess for changes in respiratory status  - Assess for changes in mentation and behavior  - Position to facilitate oxygenation and minimize respiratory effort  - Oxygen supplementation based on oxygen saturation or ABGs  - Provide Smoking Cessation handout, if applicable  - Encourage broncho-pulmonary hygiene including cough, deep breathe, Incentive Spirometry  - Assess the need for suctioning and perform as needed  - Assess and instruct to report SOB or any respiratory difficulty  - Respiratory Therapy support as indicated  - Manage/alleviate anxiety  - Monitor for signs/symptoms of CO2 retention  Outcome: Progressing

## 2023-04-25 PROCEDURE — 99232 SBSQ HOSP IP/OBS MODERATE 35: CPT | Performed by: PHYSICIAN ASSISTANT

## 2023-04-25 PROCEDURE — 99231 SBSQ HOSP IP/OBS SF/LOW 25: CPT | Performed by: HOSPITALIST

## 2023-04-25 NOTE — PROGRESS NOTES
04/25/23 0935   Clinical Encounter Type   Visited With Patient   Routine Visit Introduction   Continue Visiting No   Referral From Other (Comment)   Referral To    Taxonomy   Intended Effects Convey a calming presence   Methods Offer emotional support   Interventions Active listening   Trigger for Consult   Trigger for Spiritual Care Consult No      responded to the trigger list for visitation. Patient asked  several question as  attempted to answer ( who is the night nurse? What time is it?  prayed silently outside of the patient's room. Spiritual Care support can be requested via an Epic consult.        [facility]

## 2023-04-25 NOTE — PLAN OF CARE
Assumed patient care at 730. A/Ox4. Room air. No tele. Regular diet, good appetite. Meds crushed in apple sauce. Ambulates independently. Plan is awaiting for placement. All safety precautions are in place and will continue with plan of care.       Problem: DISCHARGE PLANNING  Goal: Discharge to home or other facility with appropriate resources  Description: INTERVENTIONS:  - Identify barriers to discharge w/pt and caregiver  - Include patient/family/discharge partner in discharge planning  - Arrange for needed discharge resources and transportation as appropriate  - Identify discharge learning needs (meds, wound care, etc)  - Arrange for interpreters to assist at discharge as needed  - Consider post-discharge preferences of patient/family/discharge partner  - Complete POLST form as appropriate  - Assess patient's ability to be responsible for managing their own health  - Refer to Case Management Department for coordinating discharge planning if the patient needs post-hospital services based on physician/LIP order or complex needs related to functional status, cognitive ability or social support system  Outcome: Progressing     Problem: Patient/Family Goals  Goal: Patient/Family Long Term Goal  Description: Patient's Long Term Goal: Discharge to appropriate facility    Interventions:  - monitor behavior  - continue psychiatric medications    - See additional Care Plan goals for specific interventions  Outcome: Progressing  Goal: Patient/Family Short Term Goal  Description: Patient's Short Term Goal: remain asymptomatic for covid    Interventions:   - monitor symptoms  - Isolation  - See additional Care Plan goals for specific interventions  Outcome: Progressing

## 2023-04-25 NOTE — PLAN OF CARE
Pt is aox4. Pt was calm and cooperative all night. Medicated per orders. Meds given crushed in applesauce. No IV. Pt covid + and in isolation. Problem: Patient/Family Goals  Goal: Patient/Family Long Term Goal  Description: Patient's Long Term Goal: Discharge to appropriate facility    Interventions:  - monitor behavior  - continue psychiatric medications    - See additional Care Plan goals for specific interventions  Outcome: Progressing  Goal: Patient/Family Short Term Goal  Description: Patient's Short Term Goal: remain asymptomatic for covid    Interventions:   - monitor symptoms  - Isolation  - See additional Care Plan goals for specific interventions  Outcome: Progressing     Problem: SAFETY ADULT - FALL  Goal: Free from fall injury  Description: INTERVENTIONS:  - Assess pt frequently for physical needs  - Identify cognitive and physical deficits and behaviors that affect risk of falls.   - [place] fall precautions as indicated by assessment.  - Educate pt/family on patient safety including physical limitations  - Instruct pt to call for assistance with activity based on assessment  - Modify environment to reduce risk of injury  - Provide assistive devices as appropriate  - Consider OT/PT consult to assist with strengthening/mobility  - Encourage toileting schedule  Outcome: Progressing     Problem: DISCHARGE PLANNING  Goal: Discharge to home or other facility with appropriate resources  Description: INTERVENTIONS:  - Identify barriers to discharge w/pt and caregiver  - Include patient/family/discharge partner in discharge planning  - Arrange for needed discharge resources and transportation as appropriate  - Identify discharge learning needs (meds, wound care, etc)  - Arrange for interpreters to assist at discharge as needed  - Consider post-discharge preferences of patient/family/discharge partner  - Complete POLST form as appropriate  - Assess patient's ability to be responsible for managing their own health  - Refer to Case Management Department for coordinating discharge planning if the patient needs post-hospital services based on physician/LIP order or complex needs related to functional status, cognitive ability or social support system  Outcome: Progressing     Problem: Agitation/Aggression  Goal: Short Term Goal  Description:     Interventions:  - Pt will refrain from harming staff  - Pt will make some effort to stop behavior      Responsible professional:         See specialized active groups intervention below.     Outcome: Progressing     Problem: RESPIRATORY - ADULT  Goal: Achieves optimal ventilation and oxygenation  Description: INTERVENTIONS:  - Assess for changes in respiratory status  - Assess for changes in mentation and behavior  - Position to facilitate oxygenation and minimize respiratory effort  - Oxygen supplementation based on oxygen saturation or ABGs  - Provide Smoking Cessation handout, if applicable  - Encourage broncho-pulmonary hygiene including cough, deep breathe, Incentive Spirometry  - Assess the need for suctioning and perform as needed  - Assess and instruct to report SOB or any respiratory difficulty  - Respiratory Therapy support as indicated  - Manage/alleviate anxiety  - Monitor for signs/symptoms of CO2 retention  Outcome: Progressing

## 2023-04-26 PROCEDURE — 99232 SBSQ HOSP IP/OBS MODERATE 35: CPT | Performed by: PHYSICIAN ASSISTANT

## 2023-04-26 PROCEDURE — 99231 SBSQ HOSP IP/OBS SF/LOW 25: CPT | Performed by: HOSPITALIST

## 2023-04-26 NOTE — PROGRESS NOTES
Pt is A&O x4. Pt remains calm and cooperative. Isolation precautions in place for Covid. Pt remains asymptomatic. General diet. Up ad pat. Constipation resolved. LBM 4/24. Psych following case. Awaiting placement.

## 2023-04-26 NOTE — PLAN OF CARE
Assumed patient care at 730. A/Ox4. Room air. No tele. Regular diet, good appetite. Meds crushed in apple sauce. Ambulates independently. Plan is awaiting for placement. All safety precautions are in place and will continue with plan of care.     Problem: DISCHARGE PLANNING  Goal: Discharge to home or other facility with appropriate resources  Description: INTERVENTIONS:  - Identify barriers to discharge w/pt and caregiver  - Include patient/family/discharge partner in discharge planning  - Arrange for needed discharge resources and transportation as appropriate  - Identify discharge learning needs (meds, wound care, etc)  - Arrange for interpreters to assist at discharge as needed  - Consider post-discharge preferences of patient/family/discharge partner  - Complete POLST form as appropriate  - Assess patient's ability to be responsible for managing their own health  - Refer to Case Management Department for coordinating discharge planning if the patient needs post-hospital services based on physician/LIP order or complex needs related to functional status, cognitive ability or social support system  Outcome: Progressing

## 2023-04-26 NOTE — PLAN OF CARE
Received a call from Kenny Rivera ([address] [place] [address] ). There was a meeting at 11:30 this morning where she met with representatives from the state, but there is no definitive placement yet. She reports that she communicated the need for the patient to either discharge to [facility] (state operated developmental center) or [facility] (short-term stabilization home), as well as the fact that his COVID quarantine will end on Friday. She reports she will be getting updates on a daily basis and the director at the [facility] level is now involved as well. She reports that his current [facility] will not be able to accept him back nor are his parents agreeable for him to discharge home. I did tell her that as the patient is not meeting criteria for psychiatric transfer and, as of Friday, he will no longer be on COVID quarantine, he will need to have a definitive discharge plan on Friday. I did tell her that if parents (as legal guardians) refuse to accept him home, we may have no other choice but to involve adult protective services. Placed a call to Kanwal ([phone]). Let him know that if there were no definitive discharge plans to [facility] or [facility] then pt would need to discharge either to his [facility] home or to parents. He asked what would happen if pt couldn't discharge to [place] [address] home; told him pt would need to discharge to legal guardians. He then asked what would happen if they couldn't take him home; told him we would need to contact adult protective services for next steps. He then asked if this forfeits their legal guardianship; told him I wasn't certain about that and he would need to have that discussion with them, but it would mean that pt would likely discharge to custody of adult protective services. He asked if Kenny Rivera from [facility] was aware and I told him yes. Will continue to be in communication with parents at [facility] regarding discharge planning.

## 2023-04-27 PROCEDURE — 99232 SBSQ HOSP IP/OBS MODERATE 35: CPT | Performed by: PHYSICIAN ASSISTANT

## 2023-04-27 NOTE — PROGRESS NOTES
Pt A&Ox4 Room Air  Resting in bed  Denies pain at this time  No Tele in place   Voiding Freely   Iso Maintained  Reg diet  Safety precaution maintained  Will continue to monitor

## 2023-04-27 NOTE — CM/SW NOTE
SW was informed that assistance is needed in locating housing for patient. Patient is a resident of [facility] which is now unable to accept him back due to patient having several altercations with residents and staff there. Patient is also reportedly unable to go to his parents home after DC due to their refusal to accept him back. Patient is ambulatory and able to manage basic ADL's according to hospital staff. Patient has not been aggressive according to RN and has not required restraints since being admitted. Patient does have Medicaid which covers long term care housing which could potentially be used while the state locates a new group home for him. This process can take several weeks. SW sent referral for Medicaid long term care housing to SNF's in the area that offer psych placement. Referral was sent to 19 facilities and is currently pending. Patient's COVID isolation ends tomorrow (4/28) and he is reported to be medically cleared. JENNIFFER will continue to work on locating housing and will also work with [facility], [facility] staff and patient's parents to locate housing option for patient. SW will continue to follow for plan of care changes and remain available for any additional DC needs or concerns.      Any Ayala MSW, LSW  Discharge Planner   [phone]

## 2023-04-28 PROCEDURE — 99231 SBSQ HOSP IP/OBS SF/LOW 25: CPT | Performed by: HOSPITALIST

## 2023-04-28 NOTE — PROGRESS NOTES
Assumed care at [address] [place] [address] 601 x 3  On R/A  No tele. Up ad pat  Pills crushed in apple sauce. Co-operative with care. Discharge,  pending placement.

## 2023-04-28 NOTE — PROGRESS NOTES
Patient tested positive for COVID 4/18/2023. He was asymptomatic, but isolation x 10 days recommended (through 4/28/2023). Patient may come out of isolation 4/29/2023 and he will no longer be at risk of transmitting COVID.      Calin DYE

## 2023-04-28 NOTE — PLAN OF CARE
Assumed pt care at 0730. A/Ox4. Room air. No tele. VSS. Regular diet. Voiding, up ad pat. Pills crushed. Waiting placement, pt updated w/ POC. Day 10 of covid isolation. Problem: Patient/Family Goals  Goal: Patient/Family Long Term Goal  Description: Patient's Long Term Goal: Discharge to appropriate facility    Interventions:  - monitor behavior  - continue psychiatric medications    - See additional Care Plan goals for specific interventions  Outcome: Progressing  Goal: Patient/Family Short Term Goal  Description: Patient's Short Term Goal: remain asymptomatic for covid    Interventions:   - monitor symptoms  - Isolation  - See additional Care Plan goals for specific interventions  Outcome: Progressing     Problem: SAFETY ADULT - FALL  Goal: Free from fall injury  Description: INTERVENTIONS:  - Assess pt frequently for physical needs  - Identify cognitive and physical deficits and behaviors that affect risk of falls.   - [place] fall precautions as indicated by assessment.  - Educate pt/family on patient safety including physical limitations  - Instruct pt to call for assistance with activity based on assessment  - Modify environment to reduce risk of injury  - Provide assistive devices as appropriate  - Consider OT/PT consult to assist with strengthening/mobility  - Encourage toileting schedule  Outcome: Progressing     Problem: DISCHARGE PLANNING  Goal: Discharge to home or other facility with appropriate resources  Description: INTERVENTIONS:  - Identify barriers to discharge w/pt and caregiver  - Include patient/family/discharge partner in discharge planning  - Arrange for needed discharge resources and transportation as appropriate  - Identify discharge learning needs (meds, wound care, etc)  - Arrange for interpreters to assist at discharge as needed  - Consider post-discharge preferences of patient/family/discharge partner  - Complete POLST form as appropriate  - Assess patient's ability to be responsible for managing their own health  - Refer to Case Management Department for coordinating discharge planning if the patient needs post-hospital services based on physician/LIP order or complex needs related to functional status, cognitive ability or social support system  Outcome: Progressing     Problem: Agitation/Aggression  Goal: Short Term Goal  Description:     Interventions:  - Pt will refrain from harming staff  - Pt will make some effort to stop behavior      Responsible professional:         See specialized active groups intervention below.     Outcome: Progressing     Problem: RESPIRATORY - ADULT  Goal: Achieves optimal ventilation and oxygenation  Description: INTERVENTIONS:  - Assess for changes in respiratory status  - Assess for changes in mentation and behavior  - Position to facilitate oxygenation and minimize respiratory effort  - Oxygen supplementation based on oxygen saturation or ABGs  - Provide Smoking Cessation handout, if applicable  - Encourage broncho-pulmonary hygiene including cough, deep breathe, Incentive Spirometry  - Assess the need for suctioning and perform as needed  - Assess and instruct to report SOB or any respiratory difficulty  - Respiratory Therapy support as indicated  - Manage/alleviate anxiety  - Monitor for signs/symptoms of CO2 retention  Outcome: Progressing

## 2023-04-28 NOTE — CM/SW NOTE
SW received call from [facility] reporting that they were looking at patient's referral but needed patient's SSN in order to check benefits. SW called patient's mother, Linda Chaney, to obtain SSN and discuss that Medicaid referrals were sent for [facility]. Linda Chaney reported that she is at work and does not have access to [employer] currently. Linda Chaney requested SW call patient's father Freedom Cardona. Linda Chaney is aware that SNF referrals were sent but would prefer for patient to go to [facility] housing [place]. SW attempted to reach patient's father Freedom Cardona but was unable to reach him. SW left a voicemail requesting a call back. Update: SW received call back from patient's father Freedom Cardona who reported that he will have to locate patient's SS card and call SW back. JENNIFFER explained that Medicaid LTC bed referrals have been sent while [facility] works to locate housing. Freedom Cardona reported that patient was accepted by [facility] and they signed admissions paperwork with Emery Whitehead from [facility] yesterday. JENNIFFER attempted to call Emery Whitehead ([phone]) for update regarding patient's housing placement and expected move in date but was unable to reach or leave a voicemail. JENNIFFER will continue to follow for plan of care changes and remain available for any additional [place] needs or concerns.      Libby George MSW, LSW  Discharge Planner   [phone]

## 2023-04-29 LAB — SARS-COV-2 RNA RESP QL NAA+PROBE: NOT DETECTED

## 2023-04-29 PROCEDURE — 99232 SBSQ HOSP IP/OBS MODERATE 35: CPT | Performed by: INTERNAL MEDICINE

## 2023-04-29 NOTE — PROGRESS NOTES
Assume care @ 8532  AO X4, Verbally responsive, RA. Denies  pain  Continent. Up ad pat. Off covid isolation. Good appetite with meals. Tolerated all meds well. Crushed with Apple sauce. Repeat covid test  Done. Fall and safety precautions in place.

## 2023-04-30 PROCEDURE — 99232 SBSQ HOSP IP/OBS MODERATE 35: CPT | Performed by: INTERNAL MEDICINE

## 2023-04-30 NOTE — PLAN OF CARE
Assumed care of patient @ 0730. A&Ox4. VSS. RA. Denies pain. Up to BR. Safety precautions in place. All needs met at this time. POC: awaiting placement. F/u with Venia Scale from [facility], unable to reach. Problem: Patient/Family Goals  Goal: Patient/Family Long Term Goal  Description: Patient's Long Term Goal: Discharge to appropriate facility  Interventions:  - monitor behavior  - continue psychiatric medications    - See additional Care Plan goals for specific interventions  Outcome: Progressing  Goal: Patient/Family Short Term Goal  Description: Patient's Short Term Goal: remain asymptomatic for covid    Interventions:   - monitor symptoms  - Isolation  - See additional Care Plan goals for specific interventions  Outcome: Progressing  Problem: SAFETY ADULT - FALL  Goal: Free from fall injury  Description: INTERVENTIONS:  - Assess pt frequently for physical needs  - Identify cognitive and physical deficits and behaviors that affect risk of falls.   - [place] fall precautions as indicated by assessment.  - Educate pt/family on patient safety including physical limitations  - Instruct pt to call for assistance with activity based on assessment  - Modify environment to reduce risk of injury  - Provide assistive devices as appropriate  - Consider OT/PT consult to assist with strengthening/mobility  - Encourage toileting schedule  Outcome: Progressing  Problem: DISCHARGE PLANNING  Goal: Discharge to home or other facility with appropriate resources  Description: INTERVENTIONS:  - Identify barriers to discharge w/pt and caregiver  - Include patient/family/discharge partner in discharge planning  - Arrange for needed discharge resources and transportation as appropriate  - Identify discharge learning needs (meds, wound care, etc)  - Arrange for interpreters to assist at discharge as needed  - Consider post-discharge preferences of patient/family/discharge partner  - Complete POLST form as appropriate  - Assess patient's ability to be responsible for managing their own health  - Refer to Case Management Department for coordinating discharge planning if the patient needs post-hospital services based on physician/LIP order or complex needs related to functional status, cognitive ability or social support system  Outcome: Progressing  Problem: Agitation/Aggression  Goal: Short Term Goal  Description:     Interventions:  - Pt will refrain from harming staff  - Pt will make some effort to stop behavior    Responsible professional:     See specialized active groups intervention below.     Outcome: Progressing  Problem: RESPIRATORY - ADULT  Goal: Achieves optimal ventilation and oxygenation  Description: INTERVENTIONS:  - Assess for changes in respiratory status  - Assess for changes in mentation and behavior  - Position to facilitate oxygenation and minimize respiratory effort  - Oxygen supplementation based on oxygen saturation or ABGs  - Provide Smoking Cessation handout, if applicable  - Encourage broncho-pulmonary hygiene including cough, deep breathe, Incentive Spirometry  - Assess the need for suctioning and perform as needed  - Assess and instruct to report SOB or any respiratory difficulty  - Respiratory Therapy support as indicated  - Manage/alleviate anxiety  - Monitor for signs/symptoms of CO2 retention  Outcome: Progressing

## 2023-04-30 NOTE — PLAN OF CARE
Pt A7O x4 Room Air  Denies pain at this time  Pt sitting Upright in bed  Meds given per Mar  Voids Freely  Awaiting placement. Safety precaution maintained   Will continue to monitor     Problem: Patient/Family Goals  Goal: Patient/Family Long Term Goal  Description: Patient's Long Term Goal: Discharge to appropriate facility    Interventions:  - monitor behavior  - continue psychiatric medications    - See additional Care Plan goals for specific interventions  Outcome: Progressing  Goal: Patient/Family Short Term Goal  Description: Patient's Short Term Goal: remain asymptomatic for covid    Interventions:   - monitor symptoms  - Isolation  - See additional Care Plan goals for specific interventions  Outcome: Progressing     Problem: SAFETY ADULT - FALL  Goal: Free from fall injury  Description: INTERVENTIONS:  - Assess pt frequently for physical needs  - Identify cognitive and physical deficits and behaviors that affect risk of falls.   - [place] fall precautions as indicated by assessment.  - Educate pt/family on patient safety including physical limitations  - Instruct pt to call for assistance with activity based on assessment  - Modify environment to reduce risk of injury  - Provide assistive devices as appropriate  - Consider OT/PT consult to assist with strengthening/mobility  - Encourage toileting schedule  Outcome: Progressing     Problem: DISCHARGE PLANNING  Goal: Discharge to home or other facility with appropriate resources  Description: INTERVENTIONS:  - Identify barriers to discharge w/pt and caregiver  - Include patient/family/discharge partner in discharge planning  - Arrange for needed discharge resources and transportation as appropriate  - Identify discharge learning needs (meds, wound care, etc)  - Arrange for interpreters to assist at discharge as needed  - Consider post-discharge preferences of patient/family/discharge partner  - Complete POLST form as appropriate  - Assess patient's ability to be responsible for managing their own health  - Refer to Case Management Department for coordinating discharge planning if the patient needs post-hospital services based on physician/LIP order or complex needs related to functional status, cognitive ability or social support system  Outcome: Progressing     Problem: Agitation/Aggression  Goal: Short Term Goal  Description:     Interventions:  - Pt will refrain from harming staff  - Pt will make some effort to stop behavior      Responsible professional:         See specialized active groups intervention below.     Outcome: Progressing     Problem: RESPIRATORY - ADULT  Goal: Achieves optimal ventilation and oxygenation  Description: INTERVENTIONS:  - Assess for changes in respiratory status  - Assess for changes in mentation and behavior  - Position to facilitate oxygenation and minimize respiratory effort  - Oxygen supplementation based on oxygen saturation or ABGs  - Provide Smoking Cessation handout, if applicable  - Encourage broncho-pulmonary hygiene including cough, deep breathe, Incentive Spirometry  - Assess the need for suctioning and perform as needed  - Assess and instruct to report SOB or any respiratory difficulty  - Respiratory Therapy support as indicated  - Manage/alleviate anxiety  - Monitor for signs/symptoms of CO2 retention  Outcome: Progressing

## 2023-05-01 PROCEDURE — 99232 SBSQ HOSP IP/OBS MODERATE 35: CPT | Performed by: PHYSICIAN ASSISTANT

## 2023-05-01 PROCEDURE — 99232 SBSQ HOSP IP/OBS MODERATE 35: CPT | Performed by: INTERNAL MEDICINE

## 2023-05-01 NOTE — PLAN OF CARE
5/1/2023 11:45 AM  Called Tate Maravilla with the [address] [phone] to inquire about discharge plan. Patient is medically and psychiatrically clear for discharge from hospital.  Apparently last week placement was secured for patient, but the location and date of discharge has not yet been determined or shared with the team at [facility]. Left voicemail message with Tate Maravilla, requesting a return phone call to discuss arrangements.

## 2023-05-01 NOTE — CM/SW NOTE
MSW called Amanda Xaviers [phone] from [facility] to discuss dc. She states that [employer] has been talking to Angeles/Deepa in Psych and agreed plan was for pt to dc on 5/3 to a group home. MSW asked if placement can be secured earlier,she will talk to facility staff and f/u with MSW. MSW spoke to Zander Pepper will call Chidi Oden from group [facility] to let them know pt is ready to dc asap. MSW asked MD to put in an order that pt is medically cleared. Per breann/Deepa Group home can not take till tue/wed but she will try to get clarification today. 1:43pm  MSW left Chidi Oden ( from group [facility]) message about dc planning. Contact # [phone]    4:25pm  No accepting JESSI at this time.

## 2023-05-01 NOTE — PROGRESS NOTES
Pt A&O x4 Room Air  Denies pain at this time  Pt sitting Upright in bed  Meds given per Mar  Voids Freely  Awaiting placement.    Safety precaution maintained   Will continue to monitor

## 2023-05-02 VITALS
TEMPERATURE: 97 F | WEIGHT: 160.94 LBS | DIASTOLIC BLOOD PRESSURE: 82 MMHG | BODY MASS INDEX: 25.86 KG/M2 | RESPIRATION RATE: 18 BRPM | SYSTOLIC BLOOD PRESSURE: 119 MMHG | HEIGHT: 66 IN | OXYGEN SATURATION: 98 % | HEART RATE: 69 BPM

## 2023-05-02 PROCEDURE — 99239 HOSP IP/OBS DSCHRG MGMT >30: CPT | Performed by: INTERNAL MEDICINE

## 2023-05-02 RX ORDER — BENZTROPINE MESYLATE 2 MG/1
2 TABLET ORAL NIGHTLY
Qty: 30 TABLET | Refills: 0 | Status: SHIPPED | OUTPATIENT
Start: 2023-05-02

## 2023-05-02 NOTE — DISCHARGE INSTRUCTIONS
You are being picked up by the [facility] at 12pm on 05/02/23. They will be transporting you to a short term stabilization program:    [facility]  [address]   Lesa Ovalle  Director- Luzma Walton  [phone]

## 2023-05-02 NOTE — BH PROGRESS NOTE
Called and spoke with the patients father, Lashanda Botello. He is aware of the patient leaving today at 12pm with the [facility] at 12pm.  He said, the patient is going to a [facility] program.  When asked the name of the place and where, he said, I would have to speak to the person who is picking up his son. Asked if there is anything I can do before their son leaves. He said, to make sure of 3 things. 1. He wants his son transported in his clothes since he is not going to another hospital.  2.  He said, to make sure his son gets all his belongings that were locked up. 3. Any medication that was the patients. He said he believes the hospital didn't have one medication and they used Ramirez's from his group home. Went and spoke with both the patients nurses Cheryl and Kristina. They were informed of the 3 request from [address] father. Since none of the care givers here know the name or address of where the patient is going accept it is in [facility], asked them to call when Ramirez's ride comes.

## 2023-05-02 NOTE — DISCHARGE PLANNING
NURSING DISCHARGE NOTE    Discharged to individual advocacy group via Ambulatory. Accompanied by Support staff from individual advocacy group  Belongings Returned to patient from safe.   Discharge order in place  Discharge education reviewed  One script given for Benztropine  No Vraylar given, supply ran out   Belongs obtained from public safety, clothing and watches taken with  All questions answered at the moment of discharge

## 2023-05-02 NOTE — PLAN OF CARE
Assumed pt care 0730. A/Ox4. No tele. VSS. Room air. Reg diet. Voiding, up ab pat. Denies pain, pills crushed in applesauce. Psych following, discharge pending. All needs met at this time. Problem: Patient/Family Goals  Goal: Patient/Family Long Term Goal  Description: Patient's Long Term Goal: Discharge to appropriate facility    Interventions:  - monitor behavior  - continue psychiatric medications    - See additional Care Plan goals for specific interventions  Outcome: Progressing  Goal: Patient/Family Short Term Goal  Description: Patient's Short Term Goal: remain asymptomatic for covid    Interventions:   - monitor symptoms  - Isolation  - See additional Care Plan goals for specific interventions  Outcome: Progressing     Problem: SAFETY ADULT - FALL  Goal: Free from fall injury  Description: INTERVENTIONS:  - Assess pt frequently for physical needs  - Identify cognitive and physical deficits and behaviors that affect risk of falls.   - [place] fall precautions as indicated by assessment.  - Educate pt/family on patient safety including physical limitations  - Instruct pt to call for assistance with activity based on assessment  - Modify environment to reduce risk of injury  - Provide assistive devices as appropriate  - Consider OT/PT consult to assist with strengthening/mobility  - Encourage toileting schedule  Outcome: Progressing     Problem: DISCHARGE PLANNING  Goal: Discharge to home or other facility with appropriate resources  Description: INTERVENTIONS:  - Identify barriers to discharge w/pt and caregiver  - Include patient/family/discharge partner in discharge planning  - Arrange for needed discharge resources and transportation as appropriate  - Identify discharge learning needs (meds, wound care, etc)  - Arrange for interpreters to assist at discharge as needed  - Consider post-discharge preferences of patient/family/discharge partner  - Complete POLST form as appropriate  - Assess patient's ability to be responsible for managing their own health  - Refer to Case Management Department for coordinating discharge planning if the patient needs post-hospital services based on physician/LIP order or complex needs related to functional status, cognitive ability or social support system  Outcome: Progressing     Problem: Agitation/Aggression  Goal: Short Term Goal  Description:     Interventions:  - Pt will refrain from harming staff  - Pt will make some effort to stop behavior      Responsible professional:         See specialized active groups intervention below.     Outcome: Progressing     Problem: RESPIRATORY - ADULT  Goal: Achieves optimal ventilation and oxygenation  Description: INTERVENTIONS:  - Assess for changes in respiratory status  - Assess for changes in mentation and behavior  - Position to facilitate oxygenation and minimize respiratory effort  - Oxygen supplementation based on oxygen saturation or ABGs  - Provide Smoking Cessation handout, if applicable  - Encourage broncho-pulmonary hygiene including cough, deep breathe, Incentive Spirometry  - Assess the need for suctioning and perform as needed  - Assess and instruct to report SOB or any respiratory difficulty  - Respiratory Therapy support as indicated  - Manage/alleviate anxiety  - Monitor for signs/symptoms of CO2 retention  Outcome: Progressing

## 2023-05-02 NOTE — PLAN OF CARE
Assumed patient care at 1930  Aox4, RA, VSS  No tele  Reg diet  Reports no pain; no n/v/d  Safety precautions in place  Bed in lowest position and call light within reach  Pt updated in POC  All needs met at this time. Problem: Patient/Family Goals  Goal: Patient/Family Long Term Goal  Description: Patient's Long Term Goal: Discharge to appropriate facility    Interventions:  - monitor behavior  - continue psychiatric medications    - See additional Care Plan goals for specific interventions  Outcome: Progressing  Goal: Patient/Family Short Term Goal  Description: Patient's Short Term Goal: remain asymptomatic for covid    Interventions:   - monitor symptoms  - Isolation  - See additional Care Plan goals for specific interventions  Outcome: Progressing     Problem: SAFETY ADULT - FALL  Goal: Free from fall injury  Description: INTERVENTIONS:  - Assess pt frequently for physical needs  - Identify cognitive and physical deficits and behaviors that affect risk of falls.   - [place] fall precautions as indicated by assessment.  - Educate pt/family on patient safety including physical limitations  - Instruct pt to call for assistance with activity based on assessment  - Modify environment to reduce risk of injury  - Provide assistive devices as appropriate  - Consider OT/PT consult to assist with strengthening/mobility  - Encourage toileting schedule  Outcome: Progressing     Problem: DISCHARGE PLANNING  Goal: Discharge to home or other facility with appropriate resources  Description: INTERVENTIONS:  - Identify barriers to discharge w/pt and caregiver  - Include patient/family/discharge partner in discharge planning  - Arrange for needed discharge resources and transportation as appropriate  - Identify discharge learning needs (meds, wound care, etc)  - Arrange for interpreters to assist at discharge as needed  - Consider post-discharge preferences of patient/family/discharge partner  - Complete POLST form as appropriate  - Assess patient's ability to be responsible for managing their own health  - Refer to Case Management Department for coordinating discharge planning if the patient needs post-hospital services based on physician/LIP order or complex needs related to functional status, cognitive ability or social support system  Outcome: Progressing     Problem: Agitation/Aggression  Goal: Short Term Goal  Description:     Interventions:  - Pt will refrain from harming staff  - Pt will make some effort to stop behavior      Responsible professional:         See specialized active groups intervention below.     Outcome: Progressing     Problem: RESPIRATORY - ADULT  Goal: Achieves optimal ventilation and oxygenation  Description: INTERVENTIONS:  - Assess for changes in respiratory status  - Assess for changes in mentation and behavior  - Position to facilitate oxygenation and minimize respiratory effort  - Oxygen supplementation based on oxygen saturation or ABGs  - Provide Smoking Cessation handout, if applicable  - Encourage broncho-pulmonary hygiene including cough, deep breathe, Incentive Spirometry  - Assess the need for suctioning and perform as needed  - Assess and instruct to report SOB or any respiratory difficulty  - Respiratory Therapy support as indicated  - Manage/alleviate anxiety  - Monitor for signs/symptoms of CO2 retention  Outcome: Progressing

## 2023-05-02 NOTE — CM/SW NOTE
Octaviano Prakash from [facility] called. Octaviano Prakash requesting 12pm - call sent to bedside RN. Placement is in [address] [facility]    MSW called pt's mother at 9:45am , she is aware of dc and agreeable. Per Maykel Allan Rn does not need to call report or set up transport.

## 2023-05-02 NOTE — PLAN OF CARE
Assumed care at 0730. A/Ox4. VSS. No tele. Room air. Voiding, up ab pat. Denies pain, N/V. Regular diet. Meds crushed in applesauce. Pt updated w/ POC, discharge today. All needs met at this time. Problem: Patient/Family Goals  Goal: Patient/Family Long Term Goal  Description: Patient's Long Term Goal: Discharge to appropriate facility    Interventions:  - monitor behavior  - continue psychiatric medications    - See additional Care Plan goals for specific interventions  Outcome: Progressing  Goal: Patient/Family Short Term Goal  Description: Patient's Short Term Goal: remain asymptomatic for covid    Interventions:   - monitor symptoms  - Isolation  - See additional Care Plan goals for specific interventions  Outcome: Progressing     Problem: SAFETY ADULT - FALL  Goal: Free from fall injury  Description: INTERVENTIONS:  - Assess pt frequently for physical needs  - Identify cognitive and physical deficits and behaviors that affect risk of falls.   - [place] fall precautions as indicated by assessment.  - Educate pt/family on patient safety including physical limitations  - Instruct pt to call for assistance with activity based on assessment  - Modify environment to reduce risk of injury  - Provide assistive devices as appropriate  - Consider OT/PT consult to assist with strengthening/mobility  - Encourage toileting schedule  Outcome: Progressing     Problem: DISCHARGE PLANNING  Goal: Discharge to home or other facility with appropriate resources  Description: INTERVENTIONS:  - Identify barriers to discharge w/pt and caregiver  - Include patient/family/discharge partner in discharge planning  - Arrange for needed discharge resources and transportation as appropriate  - Identify discharge learning needs (meds, wound care, etc)  - Arrange for interpreters to assist at discharge as needed  - Consider post-discharge preferences of patient/family/discharge partner  - Complete POLST form as appropriate  - Assess patient's ability to be responsible for managing their own health  - Refer to Case Management Department for coordinating discharge planning if the patient needs post-hospital services based on physician/LIP order or complex needs related to functional status, cognitive ability or social support system  Outcome: Progressing     Problem: Agitation/Aggression  Goal: Short Term Goal  Description:     Interventions:  - Pt will refrain from harming staff  - Pt will make some effort to stop behavior      Responsible professional:         See specialized active groups intervention below.     Outcome: Progressing     Problem: RESPIRATORY - ADULT  Goal: Achieves optimal ventilation and oxygenation  Description: INTERVENTIONS:  - Assess for changes in respiratory status  - Assess for changes in mentation and behavior  - Position to facilitate oxygenation and minimize respiratory effort  - Oxygen supplementation based on oxygen saturation or ABGs  - Provide Smoking Cessation handout, if applicable  - Encourage broncho-pulmonary hygiene including cough, deep breathe, Incentive Spirometry  - Assess the need for suctioning and perform as needed  - Assess and instruct to report SOB or any respiratory difficulty  - Respiratory Therapy support as indicated  - Manage/alleviate anxiety  - Monitor for signs/symptoms of CO2 retention  Outcome: Progressing

## 2023-09-06 ENCOUNTER — MED REC SCAN ONLY (OUTPATIENT)
Dept: FAMILY MEDICINE CLINIC | Facility: CLINIC | Age: 28
End: 2023-09-06

## 2024-05-30 NOTE — ED QUICK NOTES
Problem: PAIN - ADULT  Goal: Verbalizes/displays adequate comfort level or baseline comfort level  Description: Interventions:  - Encourage patient to monitor pain and request assistance  - Assess pain using appropriate pain scale  - Administer analgesics based on type and severity of pain and evaluate response  - Implement non-pharmacological measures as appropriate and evaluate response  - Consider cultural and social influences on pain and pain management  - Notify physician/advanced practitioner if interventions unsuccessful or patient reports new pain  Outcome: Progressing     Problem: SAFETY ADULT  Goal: Patient will remain free of falls  Description: INTERVENTIONS:  - Educate patient/family on patient safety including physical limitations  - Instruct patient to call for assistance with activity   - Consult OT/PT to assist with strengthening/mobility   - Keep Call bell within reach  - Keep bed low and locked with side rails adjusted as appropriate  - Keep care items and personal belongings within reach  - Initiate and maintain comfort rounds  - Make Fall Risk Sign visible to staff  - Offer Toileting every 2 Hours, in advance of need  - Initiate/Maintain bed/chair alarm  - Obtain necessary fall risk management equipment: alarms   - Apply yellow socks and bracelet for high fall risk patients  - Consider moving patient to room near nurses station  Outcome: Progressing     Problem: DISCHARGE PLANNING  Goal: Discharge to home or other facility with appropriate resources  Description: INTERVENTIONS:  - Identify barriers to discharge w/patient and caregiver  - Arrange for needed discharge resources and transportation as appropriate  - Identify discharge learning needs (meds, wound care, etc.)  - Arrange for interpretive services to assist at discharge as needed  - Refer to Case Management Department for coordinating discharge planning if the patient needs post-hospital services based on physician/advanced  Patient received dinner tray. Eating now. Parents at bedside. practitioner order or complex needs related to functional status, cognitive ability, or social support system  Outcome: Progressing     Problem: Knowledge Deficit  Goal: Patient/family/caregiver demonstrates understanding of disease process, treatment plan, medications, and discharge instructions  Description: Complete learning assessment and assess knowledge base.  Interventions:  - Provide teaching at level of understanding  - Provide teaching via preferred learning methods  Outcome: Progressing     Problem: NEUROSENSORY - ADULT  Goal: Achieves stable or improved neurological status  Description: INTERVENTIONS  - Monitor and report changes in neurological status  - Monitor vital signs such as temperature, blood pressure, glucose, and any other labs ordered   - Initiate measures to prevent increased intracranial pressure  - Monitor for seizure activity and implement precautions if appropriate      Outcome: Progressing     Problem: CARDIOVASCULAR - ADULT  Goal: Maintains optimal cardiac output and hemodynamic stability  Description: INTERVENTIONS:  - Monitor I/O, vital signs and rhythm  - Monitor for S/S and trends of decreased cardiac output  - Administer and titrate ordered vasoactive medications to optimize hemodynamic stability  - Assess quality of pulses, skin color and temperature  - Assess for signs of decreased coronary artery perfusion  - Instruct patient to report change in severity of symptoms  Outcome: Progressing     Problem: METABOLIC, FLUID AND ELECTROLYTES - ADULT  Goal: Electrolytes maintained within normal limits  Description: INTERVENTIONS:  - Monitor labs and assess patient for signs and symptoms of electrolyte imbalances  - Administer electrolyte replacement as ordered  - Monitor response to electrolyte replacements, including repeat lab results as appropriate  - Instruct patient on fluid and nutrition as appropriate  Outcome: Progressing     Problem: MUSCULOSKELETAL - ADULT  Goal: Maintain or  return mobility to safest level of function  Description: INTERVENTIONS:  - Assess patient's ability to carry out ADLs; assess patient's baseline for ADL function and identify physical deficits which impact ability to perform ADLs (bathing, care of mouth/teeth, toileting, grooming, dressing, etc.)  - Assess/evaluate cause of self-care deficits   - Assess range of motion  - Assess patient's mobility  - Assess patient's need for assistive devices and provide as appropriate  - Encourage maximum independence but intervene and supervise when necessary  - Involve family in performance of ADLs  - Assess for home care needs following discharge   - Consider OT consult to assist with ADL evaluation and planning for discharge  - Provide patient education as appropriate  Outcome: Progressing

## 2024-06-27 ENCOUNTER — MED REC SCAN ONLY (OUTPATIENT)
Dept: FAMILY MEDICINE CLINIC | Facility: CLINIC | Age: 29
End: 2024-06-27